# Patient Record
Sex: MALE | Race: WHITE | Employment: OTHER | URBAN - NONMETROPOLITAN AREA
[De-identification: names, ages, dates, MRNs, and addresses within clinical notes are randomized per-mention and may not be internally consistent; named-entity substitution may affect disease eponyms.]

---

## 2017-01-18 RX ORDER — CLONAZEPAM 1 MG/1
TABLET ORAL
Qty: 90 TABLET | Refills: 1 | Status: SHIPPED | OUTPATIENT
Start: 2017-01-18 | End: 2017-02-06 | Stop reason: SDUPTHER

## 2017-01-31 ENCOUNTER — OFFICE VISIT (OUTPATIENT)
Dept: INTERNAL MEDICINE | Age: 62
End: 2017-01-31

## 2017-01-31 VITALS
SYSTOLIC BLOOD PRESSURE: 134 MMHG | HEART RATE: 76 BPM | WEIGHT: 272 LBS | DIASTOLIC BLOOD PRESSURE: 84 MMHG | BODY MASS INDEX: 38.08 KG/M2 | HEIGHT: 71 IN

## 2017-01-31 DIAGNOSIS — J06.9 VIRAL URI: ICD-10-CM

## 2017-01-31 DIAGNOSIS — E78.5 DYSLIPIDEMIA: ICD-10-CM

## 2017-01-31 DIAGNOSIS — F40.01 AGORAPHOBIA WITH PANIC DISORDER: ICD-10-CM

## 2017-01-31 DIAGNOSIS — Z23 NEED FOR PROPHYLACTIC VACCINATION AND INOCULATION AGAINST INFLUENZA: ICD-10-CM

## 2017-01-31 DIAGNOSIS — R73.01 IFG (IMPAIRED FASTING GLUCOSE): Primary | ICD-10-CM

## 2017-01-31 DIAGNOSIS — R74.8 INCREASED LIVER ENZYMES: ICD-10-CM

## 2017-01-31 DIAGNOSIS — Z12.11 SPECIAL SCREENING FOR MALIGNANT NEOPLASMS, COLON: ICD-10-CM

## 2017-01-31 DIAGNOSIS — F33.42 RECURRENT MAJOR DEPRESSIVE DISORDER, IN FULL REMISSION (HCC): ICD-10-CM

## 2017-01-31 PROCEDURE — G8427 DOCREV CUR MEDS BY ELIG CLIN: HCPCS | Performed by: INTERNAL MEDICINE

## 2017-01-31 PROCEDURE — 1036F TOBACCO NON-USER: CPT | Performed by: INTERNAL MEDICINE

## 2017-01-31 PROCEDURE — 99214 OFFICE O/P EST MOD 30 MIN: CPT | Performed by: INTERNAL MEDICINE

## 2017-01-31 PROCEDURE — 90686 IIV4 VACC NO PRSV 0.5 ML IM: CPT | Performed by: INTERNAL MEDICINE

## 2017-01-31 PROCEDURE — G8484 FLU IMMUNIZE NO ADMIN: HCPCS | Performed by: INTERNAL MEDICINE

## 2017-01-31 PROCEDURE — G8419 CALC BMI OUT NRM PARAM NOF/U: HCPCS | Performed by: INTERNAL MEDICINE

## 2017-01-31 PROCEDURE — 3017F COLORECTAL CA SCREEN DOC REV: CPT | Performed by: INTERNAL MEDICINE

## 2017-01-31 PROCEDURE — G0008 ADMIN INFLUENZA VIRUS VAC: HCPCS | Performed by: INTERNAL MEDICINE

## 2017-01-31 RX ORDER — BUSPIRONE HYDROCHLORIDE 15 MG/1
15 TABLET ORAL 3 TIMES DAILY
Qty: 270 TABLET | Refills: 3 | Status: SHIPPED | OUTPATIENT
Start: 2017-01-31 | End: 2018-01-24 | Stop reason: SDUPTHER

## 2017-01-31 RX ORDER — CLONAZEPAM 1 MG/1
TABLET ORAL
Qty: 90 TABLET | Refills: 1 | Status: CANCELLED | OUTPATIENT
Start: 2017-01-31

## 2017-01-31 RX ORDER — SIMVASTATIN 20 MG
TABLET ORAL
Qty: 90 TABLET | Refills: 3 | Status: SHIPPED | OUTPATIENT
Start: 2017-01-31 | End: 2018-01-17 | Stop reason: SDUPTHER

## 2017-01-31 RX ORDER — TAMSULOSIN HYDROCHLORIDE 0.4 MG/1
CAPSULE ORAL
Qty: 90 CAPSULE | Refills: 3 | Status: SHIPPED | OUTPATIENT
Start: 2017-01-31 | End: 2018-01-24 | Stop reason: SDUPTHER

## 2017-01-31 RX ORDER — PAROXETINE 30 MG/1
TABLET, FILM COATED ORAL
Qty: 180 TABLET | Refills: 3 | Status: SHIPPED | OUTPATIENT
Start: 2017-01-31 | End: 2018-01-17 | Stop reason: SDUPTHER

## 2017-01-31 ASSESSMENT — ENCOUNTER SYMPTOMS
ABDOMINAL PAIN: 0
VOMITING: 0
NAUSEA: 0
COUGH: 1
EYE PAIN: 0
DIARRHEA: 0
BLURRED VISION: 0
SHORTNESS OF BREATH: 0
DOUBLE VISION: 0
WHEEZING: 0
EYE DISCHARGE: 0
SORE THROAT: 0
BLOOD IN STOOL: 0
CONSTIPATION: 0

## 2017-02-06 PROCEDURE — 82274 ASSAY TEST FOR BLOOD FECAL: CPT | Performed by: INTERNAL MEDICINE

## 2017-02-06 RX ORDER — CLONAZEPAM 1 MG/1
TABLET ORAL
Qty: 90 TABLET | Refills: 1 | OUTPATIENT
Start: 2017-02-06 | End: 2017-04-14 | Stop reason: SDUPTHER

## 2017-02-13 DIAGNOSIS — Z12.11 SPECIAL SCREENING FOR MALIGNANT NEOPLASMS, COLON: ICD-10-CM

## 2017-02-13 LAB
DATE, STOOL #1: NORMAL
DATE, STOOL #2: NORMAL
DATE, STOOL #3: NORMAL
HEMOCCULT SP1 STL QL: NEGATIVE
HEMOCCULT SP2 STL QL: NORMAL
HEMOCCULT SP3 STL QL: NORMAL
TIME, STOOL #1: NORMAL
TIME, STOOL #2: NORMAL
TIME, STOOL #3: NORMAL

## 2017-04-14 RX ORDER — CLONAZEPAM 1 MG/1
TABLET ORAL
Qty: 90 TABLET | Refills: 1 | Status: SHIPPED | OUTPATIENT
Start: 2017-04-14 | End: 2017-10-16 | Stop reason: SDUPTHER

## 2017-10-16 RX ORDER — CLONAZEPAM 1 MG/1
TABLET ORAL
Qty: 90 TABLET | Refills: 1 | Status: SHIPPED | OUTPATIENT
Start: 2017-10-16 | End: 2018-04-09 | Stop reason: SDUPTHER

## 2017-10-18 RX ORDER — CLONAZEPAM 1 MG/1
TABLET ORAL
Qty: 90 TABLET | Refills: 1 | OUTPATIENT
Start: 2017-10-18

## 2017-10-18 NOTE — TELEPHONE ENCOUNTER
10-19-17- office received another request for Clonazepam today. Spoke with [facility] pharmacist- they do not have it recorded that a script was called in on 10-16-17. RX given to pharmacist verbally as approved.

## 2018-01-24 ENCOUNTER — OFFICE VISIT (OUTPATIENT)
Dept: INTERNAL MEDICINE | Age: 63
End: 2018-01-24
Payer: MEDICARE

## 2018-01-24 ENCOUNTER — HOSPITAL ENCOUNTER (OUTPATIENT)
Dept: LAB | Age: 63
Setting detail: SPECIMEN
Discharge: HOME | End: 2018-01-24
Payer: MEDICARE

## 2018-01-24 VITALS
DIASTOLIC BLOOD PRESSURE: 70 MMHG | WEIGHT: 275 LBS | SYSTOLIC BLOOD PRESSURE: 138 MMHG | HEIGHT: 71 IN | HEART RATE: 80 BPM | BODY MASS INDEX: 38.5 KG/M2

## 2018-01-24 DIAGNOSIS — N40.0 BENIGN PROSTATIC HYPERPLASIA WITHOUT LOWER URINARY TRACT SYMPTOMS: ICD-10-CM

## 2018-01-24 DIAGNOSIS — Z12.11 SPECIAL SCREENING FOR MALIGNANT NEOPLASMS, COLON: ICD-10-CM

## 2018-01-24 DIAGNOSIS — R74.8 INCREASED LIVER ENZYMES: ICD-10-CM

## 2018-01-24 DIAGNOSIS — F40.01 AGORAPHOBIA WITH PANIC DISORDER: Primary | ICD-10-CM

## 2018-01-24 DIAGNOSIS — E78.5 DYSLIPIDEMIA: ICD-10-CM

## 2018-01-24 DIAGNOSIS — R73.01 IFG (IMPAIRED FASTING GLUCOSE): ICD-10-CM

## 2018-01-24 DIAGNOSIS — F33.42 RECURRENT MAJOR DEPRESSIVE DISORDER, IN FULL REMISSION (HCC): ICD-10-CM

## 2018-01-24 LAB
ABSOLUTE EOS #: 0.2 K/UL (ref 0–0.4)
ABSOLUTE IMMATURE GRANULOCYTE: ABNORMAL K/UL (ref 0–0.3)
ABSOLUTE LYMPH #: 1 K/UL (ref 1–4.8)
ABSOLUTE MONO #: 0.4 K/UL (ref 0.1–1.2)
ALT SERPL-CCNC: 29 U/L (ref 5–41)
ANION GAP SERPL CALCULATED.3IONS-SCNC: 14 MMOL/L (ref 9–17)
AST SERPL-CCNC: 27 U/L
BASOPHILS # BLD: 1 % (ref 0–2)
BASOPHILS ABSOLUTE: 0 K/UL (ref 0–0.2)
BUN BLDV-MCNC: 11 MG/DL (ref 8–23)
BUN/CREAT BLD: 14 (ref 9–20)
CALCIUM SERPL-MCNC: 9.4 MG/DL (ref 8.6–10.4)
CHLORIDE BLD-SCNC: 102 MMOL/L (ref 98–107)
CHOLESTEROL/HDL RATIO: 4.2
CHOLESTEROL: 197 MG/DL
CO2: 25 MMOL/L (ref 20–31)
CREAT SERPL-MCNC: 0.79 MG/DL (ref 0.7–1.2)
DIFFERENTIAL TYPE: ABNORMAL
EOSINOPHILS RELATIVE PERCENT: 2 % (ref 1–8)
ESTIMATED AVERAGE GLUCOSE: 117 MG/DL
GFR AFRICAN AMERICAN: >60 ML/MIN
GFR NON-AFRICAN AMERICAN: >60 ML/MIN
GFR SERPL CREATININE-BSD FRML MDRD: ABNORMAL ML/MIN/{1.73_M2}
GFR SERPL CREATININE-BSD FRML MDRD: ABNORMAL ML/MIN/{1.73_M2}
GLUCOSE BLD-MCNC: 116 MG/DL (ref 70–99)
HBA1C MFR BLD: 5.7 % (ref 4.8–5.9)
HCT VFR BLD CALC: 45.2 % (ref 41–53)
HDLC SERPL-MCNC: 47 MG/DL
HEMOGLOBIN: 15.3 G/DL (ref 13.5–17.5)
IMMATURE GRANULOCYTES: ABNORMAL %
LDL CHOLESTEROL: 113 MG/DL (ref 0–130)
LYMPHOCYTES # BLD: 14 % (ref 15–43)
MCH RBC QN AUTO: 32 PG (ref 26–34)
MCHC RBC AUTO-ENTMCNC: 33.9 G/DL (ref 31–37)
MCV RBC AUTO: 94.5 FL (ref 80–100)
MONOCYTES # BLD: 6 % (ref 6–14)
NRBC AUTOMATED: ABNORMAL PER 100 WBC
PDW BLD-RTO: 14.2 % (ref 11–14.5)
PLATELET # BLD: 202 K/UL (ref 140–450)
PLATELET ESTIMATE: ABNORMAL
PMV BLD AUTO: 8.5 FL (ref 6–12)
POTASSIUM SERPL-SCNC: 4.2 MMOL/L (ref 3.7–5.3)
RBC # BLD: 4.78 M/UL (ref 4.5–5.9)
RBC # BLD: ABNORMAL 10*6/UL
SEG NEUTROPHILS: 77 % (ref 44–74)
SEGMENTED NEUTROPHILS ABSOLUTE COUNT: 5.4 K/UL (ref 1.8–7.7)
SODIUM BLD-SCNC: 141 MMOL/L (ref 135–144)
TOTAL CK: 56 U/L (ref 39–308)
TRIGL SERPL-MCNC: 186 MG/DL
VLDLC SERPL CALC-MCNC: ABNORMAL MG/DL (ref 1–30)
WBC # BLD: 7 K/UL (ref 3.5–11)
WBC # BLD: ABNORMAL 10*3/UL

## 2018-01-24 PROCEDURE — 99214 OFFICE O/P EST MOD 30 MIN: CPT | Performed by: INTERNAL MEDICINE

## 2018-01-24 PROCEDURE — 36415 COLL VENOUS BLD VENIPUNCTURE: CPT

## 2018-01-24 PROCEDURE — 85025 COMPLETE CBC W/AUTO DIFF WBC: CPT

## 2018-01-24 PROCEDURE — G8427 DOCREV CUR MEDS BY ELIG CLIN: HCPCS | Performed by: INTERNAL MEDICINE

## 2018-01-24 PROCEDURE — G8484 FLU IMMUNIZE NO ADMIN: HCPCS | Performed by: INTERNAL MEDICINE

## 2018-01-24 PROCEDURE — G0008 ADMIN INFLUENZA VIRUS VAC: HCPCS | Performed by: INTERNAL MEDICINE

## 2018-01-24 PROCEDURE — 1036F TOBACCO NON-USER: CPT | Performed by: INTERNAL MEDICINE

## 2018-01-24 PROCEDURE — 83036 HEMOGLOBIN GLYCOSYLATED A1C: CPT

## 2018-01-24 PROCEDURE — 90686 IIV4 VACC NO PRSV 0.5 ML IM: CPT | Performed by: INTERNAL MEDICINE

## 2018-01-24 PROCEDURE — 84450 TRANSFERASE (AST) (SGOT): CPT

## 2018-01-24 PROCEDURE — G8417 CALC BMI ABV UP PARAM F/U: HCPCS | Performed by: INTERNAL MEDICINE

## 2018-01-24 PROCEDURE — 80048 BASIC METABOLIC PNL TOTAL CA: CPT

## 2018-01-24 PROCEDURE — 80061 LIPID PANEL: CPT

## 2018-01-24 PROCEDURE — 84460 ALANINE AMINO (ALT) (SGPT): CPT

## 2018-01-24 PROCEDURE — 3017F COLORECTAL CA SCREEN DOC REV: CPT | Performed by: INTERNAL MEDICINE

## 2018-01-24 PROCEDURE — 82550 ASSAY OF CK (CPK): CPT

## 2018-01-24 RX ORDER — TAMSULOSIN HYDROCHLORIDE 0.4 MG/1
CAPSULE ORAL
Qty: 90 CAPSULE | Refills: 3 | Status: SHIPPED | OUTPATIENT
Start: 2018-01-24 | End: 2019-01-04 | Stop reason: SDUPTHER

## 2018-01-24 RX ORDER — BUSPIRONE HYDROCHLORIDE 15 MG/1
15 TABLET ORAL 3 TIMES DAILY
Qty: 270 TABLET | Refills: 3 | Status: SHIPPED | OUTPATIENT
Start: 2018-01-24 | End: 2019-03-19 | Stop reason: SDUPTHER

## 2018-01-24 ASSESSMENT — PATIENT HEALTH QUESTIONNAIRE - PHQ9
SUM OF ALL RESPONSES TO PHQ QUESTIONS 1-9: 0
1. LITTLE INTEREST OR PLEASURE IN DOING THINGS: 0
2. FEELING DOWN, DEPRESSED OR HOPELESS: 0
SUM OF ALL RESPONSES TO PHQ9 QUESTIONS 1 & 2: 0

## 2018-01-24 NOTE — PROGRESS NOTES
Have you had an allergic reaction to the flu (influenza) shot? no  Are you allergic to eggs or any component of the flu vaccine? no  Do you have a history of Guillain-[place] Syndrome (GBS), which is paralysis after receiving the flu vaccine? no  Are you feeling well today? yes  Flu vaccine given as ordered. Patient tolerated it well. No questions re: VIS information.

## 2018-01-29 NOTE — PROGRESS NOTES
Yani Smoker  YOB: 1955    Date of Service:  1/24/2018      HPI:  Hubert Bruno was seen in the internal medicine office today for:  Chief Complaint  Patient presents with  · Agoraphobia. · Depression. · Blood sugar problem. · Cholesterol problem. · Liver problem. · BPH. · and continued evaluation and management of chronic medical problems. · He has had a good year. It has been just about a year since we have seen him. He really wants to try to limit his followups in this office, yet he has done very well. He is no longer seeing Psychiatry. With the agoraphobia he is functioning pretty well on the regimen that Dr. Stuart Ill set up. He really does not want to get back in and see him for followup at this point. He also feels his mood has been good with regard to the depression. · With the blood sugar we reviewed the labs. Blood sugar is 118. He is at increased risk for diabetes. We talked about the importance of diet. He would prefer not to add medication at this point. He feels he can do a little better with his diet. · We talked about his abnormal liver tests in the past and the need to watch his weight. We discussed followup liver tests. · We talked about his cholesterol and he is not having any trouble with the statin. We discussed going ahead and getting followup blood work. We talked about diet. · We talked about colon cancer screening with colonoscopy which he declines but he agrees to fecal occult blood testing. · We talked about his BPH and he feels like he has benefiting from the Flomax. Review of Systems:  Constitutional:  Negative for chills, fever, and weight loss. HENT:  Negative for congestion, ear pain, and sore throat. Eyes:  Negative for blurred vision, double vision, pain and discharge. Respiratory:  Negative for cough, shortness of breath, and wheezing.   Cardiovascular:  Negative for chest pain, palpitations, and another family member. History   Smoking Status    Former Smoker   Smokeless Tobacco    Never Used     Comment: quit 6 yrs ago     History   Alcohol Use No       Physical Examination:  Constitutional:  He appears well-developed and well-nourished. No distress. HENT:  Head: Normocephalic and atraumatic. Right Ear:  External ear normal.  Left Ear:  External ear normal.  Nose:  Nose normal.  Mouth/Throat:  Oropharynx is clear and moist.  Eyes: Conjunctivae and EOM are normal.  Pupils are equal, round, and reactive to light. Right eye exhibits no discharge. Left eye exhibits no discharge. No scleral icterus. Neck:  Normal range of motion. Neck supple. No JVD present. No tracheal deviation present. No thyromegaly present. Cardiovascular:  Normal rate, normal heart sounds, and intact distal pulses. Exam reveals no gallop. Pulmonary/Chest:  Effort normal and breath sounds normal.  No respiratory distress. He has no wheezes. He has no rales. Abdominal:  Soft. Normal aorta and bowel sounds are normal.  He exhibits no distension and no mass. There is no hepatosplenomegaly. There is no tenderness. There is no rebound and no guarding. Musculoskeletal:  Normal range of motion. He exhibits no edema or tenderness. Lymphadenopathy:    He has no cervical adenopathy. Neurological:  He is alert. He has normal strength. No cranial nerve deficit or sensory deficit. He exhibits normal muscle tone. Skin:  Skin is warm and dry. No rash noted. He is not diaphoretic. No pallor. Psychiatric:  He has a normal mood and affect. His behavior is normal.  Judgment normal.      Vitals:    01/24/18 1010   BP: 138/70   Site: Right Arm   Position: Sitting   Cuff Size: Large Adult   Pulse: 80   Weight: 275 lb (124.7 kg)   Height: 5' 11\" (1.803 m)     Body mass index is 38.35 kg/m².     Wt Readings from Last 3 Encounters:   01/24/18 275 lb (124.7 kg)   01/31/17 272 lb (123.4 kg)   01/28/16 272 lb (123.4 kg)     BP

## 2018-01-30 ENCOUNTER — HOSPITAL ENCOUNTER (OUTPATIENT)
Age: 63
Setting detail: SPECIMEN
Discharge: HOME | End: 2018-01-30
Payer: MEDICARE

## 2018-01-30 PROCEDURE — 82274 ASSAY TEST FOR BLOOD FECAL: CPT

## 2018-01-31 DIAGNOSIS — Z12.11 SPECIAL SCREENING FOR MALIGNANT NEOPLASMS, COLON: ICD-10-CM

## 2018-01-31 LAB
DATE, STOOL #1: ABNORMAL
DATE, STOOL #2: ABNORMAL
DATE, STOOL #3: ABNORMAL
HEMOCCULT SP1 STL QL: POSITIVE
HEMOCCULT SP2 STL QL: ABNORMAL
HEMOCCULT SP3 STL QL: ABNORMAL
TIME, STOOL #1: 2200
TIME, STOOL #2: ABNORMAL
TIME, STOOL #3: ABNORMAL

## 2018-02-09 ENCOUNTER — TELEPHONE (OUTPATIENT)
Dept: INTERNAL MEDICINE | Age: 63
End: 2018-02-09

## 2018-02-09 DIAGNOSIS — Z12.11 SCREENING FOR COLON CANCER: Primary | ICD-10-CM

## 2018-02-12 ENCOUNTER — HOSPITAL ENCOUNTER (OUTPATIENT)
Age: 63
Setting detail: SPECIMEN
Discharge: HOME | End: 2018-02-12
Payer: MEDICARE

## 2018-02-12 LAB
DATE, STOOL #1: NORMAL
DATE, STOOL #2: NORMAL
DATE, STOOL #3: NORMAL
HEMOCCULT SP1 STL QL: NEGATIVE
HEMOCCULT SP2 STL QL: NORMAL
HEMOCCULT SP3 STL QL: NORMAL
TIME, STOOL #1: 930
TIME, STOOL #2: NORMAL
TIME, STOOL #3: NORMAL

## 2018-02-12 PROCEDURE — 82274 ASSAY TEST FOR BLOOD FECAL: CPT

## 2018-04-09 DIAGNOSIS — F40.01 AGORAPHOBIA WITH PANIC DISORDER: Primary | ICD-10-CM

## 2018-04-09 RX ORDER — CLONAZEPAM 1 MG/1
TABLET ORAL
Qty: 90 TABLET | Refills: 1 | Status: SHIPPED | OUTPATIENT
Start: 2018-04-09 | End: 2018-10-15 | Stop reason: SDUPTHER

## 2018-10-15 DIAGNOSIS — F40.01 AGORAPHOBIA WITH PANIC DISORDER: ICD-10-CM

## 2018-10-15 RX ORDER — CLONAZEPAM 1 MG/1
TABLET ORAL
Qty: 90 TABLET | Refills: 1 | Status: SHIPPED | OUTPATIENT
Start: 2018-10-15 | End: 2019-04-16 | Stop reason: SDUPTHER

## 2019-01-04 DIAGNOSIS — N40.0 BENIGN PROSTATIC HYPERPLASIA WITHOUT LOWER URINARY TRACT SYMPTOMS: ICD-10-CM

## 2019-01-04 RX ORDER — SIMVASTATIN 20 MG
TABLET ORAL
Qty: 90 TABLET | Refills: 3 | Status: SHIPPED | OUTPATIENT
Start: 2019-01-04 | End: 2019-12-19 | Stop reason: SDUPTHER

## 2019-01-04 RX ORDER — TAMSULOSIN HYDROCHLORIDE 0.4 MG/1
CAPSULE ORAL
Qty: 90 CAPSULE | Refills: 3 | Status: SHIPPED | OUTPATIENT
Start: 2019-01-04 | End: 2019-12-19 | Stop reason: SDUPTHER

## 2019-01-04 RX ORDER — PAROXETINE 30 MG/1
TABLET, FILM COATED ORAL
Qty: 180 TABLET | Refills: 3 | Status: SHIPPED | OUTPATIENT
Start: 2019-01-04 | End: 2019-12-19 | Stop reason: SDUPTHER

## 2019-03-12 ENCOUNTER — OFFICE VISIT (OUTPATIENT)
Dept: ORTHOPEDIC SURGERY | Age: 64
End: 2019-03-12
Payer: MEDICARE

## 2019-03-12 VITALS
HEART RATE: 84 BPM | SYSTOLIC BLOOD PRESSURE: 136 MMHG | HEIGHT: 71 IN | BODY MASS INDEX: 39.2 KG/M2 | WEIGHT: 280 LBS | DIASTOLIC BLOOD PRESSURE: 80 MMHG

## 2019-03-12 DIAGNOSIS — S52.502A CLOSED FRACTURE OF DISTAL END OF LEFT RADIUS, UNSPECIFIED FRACTURE MORPHOLOGY, INITIAL ENCOUNTER: ICD-10-CM

## 2019-03-12 DIAGNOSIS — M25.532 LEFT WRIST PAIN: Primary | ICD-10-CM

## 2019-03-12 PROCEDURE — 25600 CLTX DST RDL FX/EPHYS SEP WO: CPT | Performed by: PHYSICIAN ASSISTANT

## 2019-03-12 PROCEDURE — L3908 WHO COCK-UP NONMOLDE PRE OTS: HCPCS | Performed by: PHYSICIAN ASSISTANT

## 2019-03-12 RX ORDER — IBUPROFEN 800 MG/1
800 TABLET ORAL EVERY 6 HOURS PRN
COMMUNITY
End: 2021-02-11

## 2019-03-19 ENCOUNTER — OFFICE VISIT (OUTPATIENT)
Dept: INTERNAL MEDICINE | Age: 64
End: 2019-03-19
Payer: MEDICARE

## 2019-03-19 ENCOUNTER — HOSPITAL ENCOUNTER (OUTPATIENT)
Age: 64
Setting detail: SPECIMEN
Discharge: HOME | End: 2019-03-19
Payer: MEDICARE

## 2019-03-19 VITALS
WEIGHT: 278 LBS | HEIGHT: 71 IN | HEART RATE: 72 BPM | SYSTOLIC BLOOD PRESSURE: 134 MMHG | DIASTOLIC BLOOD PRESSURE: 74 MMHG | BODY MASS INDEX: 38.92 KG/M2

## 2019-03-19 DIAGNOSIS — R73.01 IFG (IMPAIRED FASTING GLUCOSE): ICD-10-CM

## 2019-03-19 DIAGNOSIS — Z12.11 SPECIAL SCREENING FOR MALIGNANT NEOPLASMS, COLON: ICD-10-CM

## 2019-03-19 DIAGNOSIS — Z00.00 ROUTINE GENERAL MEDICAL EXAMINATION AT A HEALTH CARE FACILITY: Primary | ICD-10-CM

## 2019-03-19 DIAGNOSIS — Z12.11 SCREENING FOR COLON CANCER: ICD-10-CM

## 2019-03-19 DIAGNOSIS — F40.01 AGORAPHOBIA WITH PANIC DISORDER: ICD-10-CM

## 2019-03-19 DIAGNOSIS — R74.8 INCREASED LIVER ENZYMES: ICD-10-CM

## 2019-03-19 DIAGNOSIS — E78.5 DYSLIPIDEMIA: ICD-10-CM

## 2019-03-19 DIAGNOSIS — N40.0 BENIGN PROSTATIC HYPERPLASIA WITHOUT LOWER URINARY TRACT SYMPTOMS: ICD-10-CM

## 2019-03-19 DIAGNOSIS — F33.42 RECURRENT MAJOR DEPRESSIVE DISORDER, IN FULL REMISSION (HCC): ICD-10-CM

## 2019-03-19 DIAGNOSIS — Z13.6 SCREENING FOR CARDIOVASCULAR CONDITION: ICD-10-CM

## 2019-03-19 LAB
ALT SERPL-CCNC: 34 U/L (ref 5–41)
ANION GAP SERPL CALCULATED.3IONS-SCNC: 12 MMOL/L (ref 9–17)
AST SERPL-CCNC: 35 U/L
BUN BLDV-MCNC: 11 MG/DL (ref 8–23)
BUN/CREAT BLD: 14 (ref 9–20)
CALCIUM SERPL-MCNC: 9.3 MG/DL (ref 8.6–10.4)
CHLORIDE BLD-SCNC: 100 MMOL/L (ref 98–107)
CHOLESTEROL/HDL RATIO: 4.1
CHOLESTEROL: 187 MG/DL
CO2: 29 MMOL/L (ref 20–31)
CREAT SERPL-MCNC: 0.79 MG/DL (ref 0.7–1.2)
ESTIMATED AVERAGE GLUCOSE: 120 MG/DL
GFR AFRICAN AMERICAN: >60 ML/MIN
GFR NON-AFRICAN AMERICAN: >60 ML/MIN
GFR SERPL CREATININE-BSD FRML MDRD: ABNORMAL ML/MIN/{1.73_M2}
GFR SERPL CREATININE-BSD FRML MDRD: ABNORMAL ML/MIN/{1.73_M2}
GLUCOSE BLD-MCNC: 106 MG/DL (ref 70–99)
HBA1C MFR BLD: 5.8 % (ref 4.8–5.9)
HDLC SERPL-MCNC: 46 MG/DL
LDL CHOLESTEROL: 109 MG/DL (ref 0–130)
POTASSIUM SERPL-SCNC: 4.3 MMOL/L (ref 3.7–5.3)
SODIUM BLD-SCNC: 141 MMOL/L (ref 135–144)
TOTAL CK: 65 U/L (ref 39–308)
TRIGL SERPL-MCNC: 161 MG/DL
VLDLC SERPL CALC-MCNC: ABNORMAL MG/DL (ref 1–30)

## 2019-03-19 PROCEDURE — 80061 LIPID PANEL: CPT

## 2019-03-19 PROCEDURE — 84450 TRANSFERASE (AST) (SGOT): CPT

## 2019-03-19 PROCEDURE — 82550 ASSAY OF CK (CPK): CPT

## 2019-03-19 PROCEDURE — G0446 INTENS BEHAVE THER CARDIO DX: HCPCS | Performed by: INTERNAL MEDICINE

## 2019-03-19 PROCEDURE — G0438 PPPS, INITIAL VISIT: HCPCS | Performed by: INTERNAL MEDICINE

## 2019-03-19 PROCEDURE — 83036 HEMOGLOBIN GLYCOSYLATED A1C: CPT

## 2019-03-19 PROCEDURE — 36415 COLL VENOUS BLD VENIPUNCTURE: CPT

## 2019-03-19 PROCEDURE — 80048 BASIC METABOLIC PNL TOTAL CA: CPT

## 2019-03-19 PROCEDURE — 90686 IIV4 VACC NO PRSV 0.5 ML IM: CPT | Performed by: INTERNAL MEDICINE

## 2019-03-19 PROCEDURE — G0008 ADMIN INFLUENZA VIRUS VAC: HCPCS | Performed by: INTERNAL MEDICINE

## 2019-03-19 PROCEDURE — G8482 FLU IMMUNIZE ORDER/ADMIN: HCPCS | Performed by: INTERNAL MEDICINE

## 2019-03-19 PROCEDURE — G8417 CALC BMI ABV UP PARAM F/U: HCPCS | Performed by: INTERNAL MEDICINE

## 2019-03-19 PROCEDURE — 99213 OFFICE O/P EST LOW 20 MIN: CPT | Performed by: INTERNAL MEDICINE

## 2019-03-19 PROCEDURE — 1036F TOBACCO NON-USER: CPT | Performed by: INTERNAL MEDICINE

## 2019-03-19 PROCEDURE — 84460 ALANINE AMINO (ALT) (SGPT): CPT

## 2019-03-19 PROCEDURE — G8427 DOCREV CUR MEDS BY ELIG CLIN: HCPCS | Performed by: INTERNAL MEDICINE

## 2019-03-19 PROCEDURE — 3017F COLORECTAL CA SCREEN DOC REV: CPT | Performed by: INTERNAL MEDICINE

## 2019-03-19 RX ORDER — BUSPIRONE HYDROCHLORIDE 15 MG/1
15 TABLET ORAL 3 TIMES DAILY
Qty: 270 TABLET | Refills: 3 | Status: SHIPPED | OUTPATIENT
Start: 2019-03-19 | End: 2020-03-11 | Stop reason: SDUPTHER

## 2019-03-19 ASSESSMENT — PATIENT HEALTH QUESTIONNAIRE - PHQ9
SUM OF ALL RESPONSES TO PHQ QUESTIONS 1-9: 0
SUM OF ALL RESPONSES TO PHQ QUESTIONS 1-9: 0

## 2019-03-19 ASSESSMENT — ANXIETY QUESTIONNAIRES: GAD7 TOTAL SCORE: 3

## 2019-03-19 ASSESSMENT — LIFESTYLE VARIABLES: HOW OFTEN DO YOU HAVE A DRINK CONTAINING ALCOHOL: 0

## 2019-03-21 DIAGNOSIS — S62.102S CLOSED FRACTURE OF LEFT WRIST, SEQUELA: Primary | ICD-10-CM

## 2019-06-05 ENCOUNTER — HOSPITAL ENCOUNTER (OUTPATIENT)
Age: 64
Setting detail: SPECIMEN
Discharge: HOME | End: 2019-06-05
Payer: MEDICARE

## 2019-06-05 DIAGNOSIS — Z12.11 SPECIAL SCREENING FOR MALIGNANT NEOPLASMS, COLON: ICD-10-CM

## 2019-06-05 LAB
DATE, STOOL #1: NORMAL
DATE, STOOL #2: NORMAL
DATE, STOOL #3: NORMAL
HEMOCCULT SP1 STL QL: NEGATIVE
HEMOCCULT SP2 STL QL: NORMAL
HEMOCCULT SP3 STL QL: NORMAL
TIME, STOOL #1: 900
TIME, STOOL #2: NORMAL
TIME, STOOL #3: NORMAL

## 2019-06-05 PROCEDURE — 82274 ASSAY TEST FOR BLOOD FECAL: CPT

## 2019-10-17 DIAGNOSIS — F40.01 AGORAPHOBIA WITH PANIC DISORDER: ICD-10-CM

## 2019-10-18 RX ORDER — CLONAZEPAM 1 MG/1
TABLET ORAL
Qty: 90 TABLET | Refills: 0 | Status: SHIPPED | OUTPATIENT
Start: 2019-10-18 | End: 2020-01-13

## 2019-12-19 DIAGNOSIS — N40.0 BENIGN PROSTATIC HYPERPLASIA WITHOUT LOWER URINARY TRACT SYMPTOMS: ICD-10-CM

## 2019-12-19 RX ORDER — SIMVASTATIN 20 MG
20 TABLET ORAL NIGHTLY
Qty: 90 TABLET | Refills: 3 | Status: SHIPPED | OUTPATIENT
Start: 2019-12-19 | End: 2019-12-19 | Stop reason: SDUPTHER

## 2019-12-19 RX ORDER — PAROXETINE 30 MG/1
30 TABLET, FILM COATED ORAL EVERY MORNING
Qty: 90 TABLET | Refills: 3 | Status: SHIPPED | OUTPATIENT
Start: 2019-12-19 | End: 2019-12-19 | Stop reason: SDUPTHER

## 2019-12-19 RX ORDER — PAROXETINE 30 MG/1
30 TABLET, FILM COATED ORAL EVERY MORNING
Qty: 90 TABLET | Refills: 0 | Status: SHIPPED | OUTPATIENT
Start: 2019-12-19 | End: 2019-12-23 | Stop reason: SDUPTHER

## 2019-12-19 RX ORDER — PAROXETINE 30 MG/1
30 TABLET, FILM COATED ORAL EVERY MORNING
Qty: 180 TABLET | Refills: 3 | Status: SHIPPED | OUTPATIENT
Start: 2019-12-19 | End: 2019-12-19 | Stop reason: SDUPTHER

## 2019-12-19 RX ORDER — SIMVASTATIN 20 MG
20 TABLET ORAL NIGHTLY
Qty: 90 TABLET | Refills: 0 | Status: SHIPPED | OUTPATIENT
Start: 2019-12-19 | End: 2020-01-13 | Stop reason: SDUPTHER

## 2019-12-19 RX ORDER — TAMSULOSIN HYDROCHLORIDE 0.4 MG/1
CAPSULE ORAL
Qty: 90 CAPSULE | Refills: 3 | Status: SHIPPED | OUTPATIENT
Start: 2019-12-19 | End: 2021-01-12 | Stop reason: SDUPTHER

## 2019-12-23 RX ORDER — PAROXETINE 30 MG/1
60 TABLET, FILM COATED ORAL EVERY MORNING
Qty: 180 TABLET | Refills: 3 | Status: SHIPPED | OUTPATIENT
Start: 2019-12-23 | End: 2020-03-11 | Stop reason: SDUPTHER

## 2020-01-13 RX ORDER — CLONAZEPAM 1 MG/1
TABLET ORAL
Qty: 90 TABLET | Refills: 0 | Status: SHIPPED | OUTPATIENT
Start: 2020-01-13 | End: 2020-04-13 | Stop reason: SDUPTHER

## 2020-01-13 RX ORDER — CLONAZEPAM 1 MG/1
TABLET ORAL
Qty: 90 TABLET | Refills: 0 | Status: CANCELLED | OUTPATIENT
Start: 2020-01-13 | End: 2020-04-13

## 2020-01-13 RX ORDER — SIMVASTATIN 20 MG
20 TABLET ORAL NIGHTLY
Qty: 90 TABLET | Refills: 3 | Status: SHIPPED | OUTPATIENT
Start: 2020-01-13 | End: 2021-01-12 | Stop reason: SDUPTHER

## 2020-03-11 RX ORDER — BUSPIRONE HYDROCHLORIDE 15 MG/1
15 TABLET ORAL 3 TIMES DAILY
Qty: 270 TABLET | Refills: 3 | Status: SHIPPED | OUTPATIENT
Start: 2020-03-11 | End: 2020-12-07 | Stop reason: SDUPTHER

## 2020-03-11 RX ORDER — PAROXETINE 30 MG/1
60 TABLET, FILM COATED ORAL EVERY MORNING
Qty: 180 TABLET | Refills: 3 | Status: SHIPPED | OUTPATIENT
Start: 2020-03-11 | End: 2020-12-07 | Stop reason: SDUPTHER

## 2020-04-13 ENCOUNTER — VIRTUAL VISIT (OUTPATIENT)
Dept: INTERNAL MEDICINE | Age: 65
End: 2020-04-13
Payer: MEDICARE

## 2020-04-13 VITALS — BODY MASS INDEX: 39.05 KG/M2 | WEIGHT: 280 LBS

## 2020-04-13 PROCEDURE — G0439 PPPS, SUBSEQ VISIT: HCPCS | Performed by: INTERNAL MEDICINE

## 2020-04-13 PROCEDURE — 99214 OFFICE O/P EST MOD 30 MIN: CPT | Performed by: INTERNAL MEDICINE

## 2020-04-13 RX ORDER — CLONAZEPAM 1 MG/1
TABLET ORAL
Qty: 90 TABLET | Refills: 0 | Status: SHIPPED | OUTPATIENT
Start: 2020-04-13 | End: 2020-07-15

## 2020-04-13 ASSESSMENT — PATIENT HEALTH QUESTIONNAIRE - PHQ9
SUM OF ALL RESPONSES TO PHQ QUESTIONS 1-9: 0
SUM OF ALL RESPONSES TO PHQ QUESTIONS 1-9: 0

## 2020-04-13 ASSESSMENT — LIFESTYLE VARIABLES: HOW OFTEN DO YOU HAVE A DRINK CONTAINING ALCOHOL: 0

## 2020-04-13 NOTE — PROGRESS NOTES
visible discharge  HENT: External ears normal. No external lesions on the nose  Neck: No gross masses. No visible lesions. Respiratory: Respiratory effort normal. No visible signs of difficulty breathing  Skin: No abnormal rashes. No abnormal masses  Neurologic: Cranial nerves grossly intact  Psychiatric: Normal affect. Alert and oriented        Labs Reviewed 4/13/2020:    Lab Results   Component Value Date    WBC 7.0 01/24/2018    HGB 15.3 01/24/2018    HCT 45.2 01/24/2018     01/24/2018    CHOL 187 03/19/2019    TRIG 161 (H) 03/19/2019    HDL 46 03/19/2019    ALT 34 03/19/2019    AST 35 03/19/2019     03/19/2019    K 4.3 03/19/2019     03/19/2019    CREATININE 0.79 03/19/2019    BUN 11 03/19/2019    CO2 29 03/19/2019    TSH 2.07 10/15/2013    LABA1C 5.8 03/19/2019       Plan        Anxiety/panic disorder: Seems to be under control with current regimen, no evidence of abuse of Klonopin. Will continue. BPH: Continue Flomax. Asymptomatic at this time. Hyperlipidemia: Continue statin. Will obtain lipid panel next visit. Diagnosis Orders   1. Benign prostatic hyperplasia without lower urinary tract symptoms     2. IFG (impaired fasting glucose)  Hemoglobin A1C   3. Dyslipidemia  CBC Auto Differential    Comprehensive Metabolic Panel    Lipid Panel   4. Agoraphobia with panic disorder  clonazePAM (KLONOPIN) 1 MG tablet   5. Routine general medical examination at a health care facility           No follow-ups on file. Discussed use, benefit, and side effects of prescribed medications. All patient questions answered. Pt voiced understanding. Reviewed health maintenance. Electronically signed Heddy Boxer, MD on 4/13/2020 at 3:29 PM      This note has been created using the Epic electronic health record, and dictated in part by [employer] dictation system. Despite the documenting physician's best efforts, there may be errors in spelling, grammar or syntax.     Pursuant to the emergency declaration under the [facility] waiver authority and the [employer] Act, this Virtual Visit was conducted, with patient's consent, to reduce the patient's risk of exposure to COVID-19 and provide continuity of care for an established patient. Services were provided through a video synchronous discussion virtually to substitute for in-person clinic visit.

## 2020-07-15 RX ORDER — CLONAZEPAM 1 MG/1
TABLET ORAL
Qty: 90 TABLET | Refills: 0 | Status: SHIPPED | OUTPATIENT
Start: 2020-07-15 | End: 2020-10-13 | Stop reason: SDUPTHER

## 2020-09-09 ENCOUNTER — TELEPHONE (OUTPATIENT)
Dept: INTERNAL MEDICINE | Age: 65
End: 2020-09-09

## 2020-09-09 RX ORDER — LORAZEPAM 0.5 MG/1
0.5 TABLET ORAL EVERY 8 HOURS PRN
Qty: 2 TABLET | Refills: 0 | Status: SHIPPED | OUTPATIENT
Start: 2020-09-09 | End: 2020-09-10

## 2020-09-09 NOTE — TELEPHONE ENCOUNTER
Spoke with Helen and the procedure tomorrow if they can do the laser will be at least 40 minutes. Then he will have two more follow ups. She was wondering if you could give him something for the anxiety. Coming to the clinic he is fine, but for some reason he gets all worked up going to [employer] [employer]. Since his eye doctor goes there, there is no consoling him. Please advise.

## 2020-09-09 NOTE — TELEPHONE ENCOUNTER
Yes I can prescribe him Ativan, but I would prefer to know how long the surgery is going to be, and if he is going to get surgery tomorrow or not??

## 2020-09-09 NOTE — TELEPHONE ENCOUNTER
Spoke with Marycruz Benitez and she is going to check to see how long the surgery is going to take and if he needs to follw up with them.

## 2020-09-09 NOTE — TELEPHONE ENCOUNTER
Daughter-in-law, Gabriela Mary called. Cheri Hayes has an eye appt tomorrow and it may require surgery. Dr Priscilla Terrell said to contact our office to see if he could get something to take, to calm him, in case he does have to, as he has anxiety-panic disorder and he is already nervous about it. [facility]   Let Gabriela Hernandez know at [phone]

## 2020-09-14 ENCOUNTER — TELEPHONE (OUTPATIENT)
Dept: INTERNAL MEDICINE | Age: 65
End: 2020-09-14

## 2020-09-14 RX ORDER — LORAZEPAM 0.5 MG/1
0.5 TABLET ORAL 2 TIMES DAILY PRN
Qty: 2 TABLET | Refills: 1 | Status: SHIPPED | OUTPATIENT
Start: 2020-09-14 | End: 2020-10-01 | Stop reason: SDUPTHER

## 2020-09-14 NOTE — TELEPHONE ENCOUNTER
Patient has used the two tablets of Lorazepam he had for his anxiety for eye procedure. He has another appt next Monday and will need more medication for the anxiety.    Uses [facility]

## 2020-10-01 RX ORDER — LORAZEPAM 0.5 MG/1
0.5 TABLET ORAL 2 TIMES DAILY PRN
Qty: 2 TABLET | Refills: 0 | Status: SHIPPED | OUTPATIENT
Start: 2020-10-01 | End: 2020-11-16 | Stop reason: SDUPTHER

## 2020-10-13 ENCOUNTER — OFFICE VISIT (OUTPATIENT)
Dept: INTERNAL MEDICINE | Age: 65
End: 2020-10-13
Payer: MEDICARE

## 2020-10-13 ENCOUNTER — HOSPITAL ENCOUNTER (OUTPATIENT)
Dept: LAB | Age: 65
Discharge: HOME | End: 2020-10-13
Payer: MEDICARE

## 2020-10-13 VITALS
BODY MASS INDEX: 39.2 KG/M2 | HEIGHT: 71 IN | RESPIRATION RATE: 16 BRPM | HEART RATE: 76 BPM | WEIGHT: 280 LBS | SYSTOLIC BLOOD PRESSURE: 120 MMHG | DIASTOLIC BLOOD PRESSURE: 70 MMHG

## 2020-10-13 PROBLEM — E66.01 MORBIDLY OBESE (HCC): Status: ACTIVE | Noted: 2020-10-13

## 2020-10-13 LAB
ABSOLUTE EOS #: 0.14 K/UL (ref 0–0.44)
ABSOLUTE IMMATURE GRANULOCYTE: 0.03 K/UL (ref 0–0.3)
ABSOLUTE LYMPH #: 1.15 K/UL (ref 1.1–3.7)
ABSOLUTE MONO #: 0.38 K/UL (ref 0.1–1.2)
ALBUMIN SERPL-MCNC: 4.4 G/DL (ref 3.5–5.2)
ALBUMIN/GLOBULIN RATIO: 1.4 (ref 1–2.5)
ALP BLD-CCNC: 79 U/L (ref 40–129)
ALT SERPL-CCNC: 23 U/L (ref 5–41)
ANION GAP SERPL CALCULATED.3IONS-SCNC: 10 MMOL/L (ref 9–17)
AST SERPL-CCNC: 26 U/L
BASOPHILS # BLD: 1 % (ref 0–2)
BASOPHILS ABSOLUTE: 0.06 K/UL (ref 0–0.2)
BILIRUB SERPL-MCNC: 0.53 MG/DL (ref 0.3–1.2)
BUN BLDV-MCNC: 12 MG/DL (ref 8–23)
BUN/CREAT BLD: 15 (ref 9–20)
CALCIUM SERPL-MCNC: 9.7 MG/DL (ref 8.6–10.4)
CHLORIDE BLD-SCNC: 101 MMOL/L (ref 98–107)
CHOLESTEROL/HDL RATIO: 4.2
CHOLESTEROL: 187 MG/DL
CO2: 26 MMOL/L (ref 20–31)
CREAT SERPL-MCNC: 0.8 MG/DL (ref 0.7–1.2)
DIFFERENTIAL TYPE: ABNORMAL
EOSINOPHILS RELATIVE PERCENT: 2 % (ref 1–4)
ESTIMATED AVERAGE GLUCOSE: 117 MG/DL
GFR AFRICAN AMERICAN: >60 ML/MIN
GFR NON-AFRICAN AMERICAN: >60 ML/MIN
GFR SERPL CREATININE-BSD FRML MDRD: ABNORMAL ML/MIN/{1.73_M2}
GFR SERPL CREATININE-BSD FRML MDRD: ABNORMAL ML/MIN/{1.73_M2}
GLUCOSE BLD-MCNC: 137 MG/DL (ref 70–99)
HBA1C MFR BLD: 5.7 % (ref 4.8–5.9)
HCT VFR BLD CALC: 45.2 % (ref 40.7–50.3)
HDLC SERPL-MCNC: 45 MG/DL
HEMOGLOBIN: 14.5 G/DL (ref 13–17)
IMMATURE GRANULOCYTES: 0 %
LDL CHOLESTEROL: 103 MG/DL (ref 0–130)
LYMPHOCYTES # BLD: 17 % (ref 24–43)
MCH RBC QN AUTO: 31 PG (ref 25.2–33.5)
MCHC RBC AUTO-ENTMCNC: 32.1 G/DL (ref 25.2–33.5)
MCV RBC AUTO: 96.6 FL (ref 82.6–102.9)
MONOCYTES # BLD: 6 % (ref 3–12)
NRBC AUTOMATED: 0 PER 100 WBC
PDW BLD-RTO: 13.4 % (ref 11.8–14.4)
PLATELET # BLD: 205 K/UL (ref 138–453)
PLATELET ESTIMATE: ABNORMAL
PMV BLD AUTO: 10.2 FL (ref 8.1–13.5)
POTASSIUM SERPL-SCNC: 4.2 MMOL/L (ref 3.7–5.3)
RBC # BLD: 4.68 M/UL (ref 4.21–5.77)
RBC # BLD: ABNORMAL 10*6/UL
SEG NEUTROPHILS: 74 % (ref 36–65)
SEGMENTED NEUTROPHILS ABSOLUTE COUNT: 5.11 K/UL (ref 1.5–8.1)
SODIUM BLD-SCNC: 137 MMOL/L (ref 135–144)
TOTAL PROTEIN: 7.5 G/DL (ref 6.4–8.3)
TRIGL SERPL-MCNC: 197 MG/DL
VLDLC SERPL CALC-MCNC: ABNORMAL MG/DL (ref 1–30)
WBC # BLD: 6.9 K/UL (ref 3.5–11.3)
WBC # BLD: ABNORMAL 10*3/UL

## 2020-10-13 PROCEDURE — G0008 ADMIN INFLUENZA VIRUS VAC: HCPCS | Performed by: INTERNAL MEDICINE

## 2020-10-13 PROCEDURE — 99214 OFFICE O/P EST MOD 30 MIN: CPT | Performed by: INTERNAL MEDICINE

## 2020-10-13 PROCEDURE — 36415 COLL VENOUS BLD VENIPUNCTURE: CPT

## 2020-10-13 PROCEDURE — 83036 HEMOGLOBIN GLYCOSYLATED A1C: CPT

## 2020-10-13 PROCEDURE — 80061 LIPID PANEL: CPT

## 2020-10-13 PROCEDURE — 85025 COMPLETE CBC W/AUTO DIFF WBC: CPT

## 2020-10-13 PROCEDURE — 80053 COMPREHEN METABOLIC PANEL: CPT

## 2020-10-13 PROCEDURE — 90694 VACC AIIV4 NO PRSRV 0.5ML IM: CPT | Performed by: INTERNAL MEDICINE

## 2020-10-13 PROCEDURE — 99214 OFFICE O/P EST MOD 30 MIN: CPT

## 2020-10-13 RX ORDER — BRIMONIDINE TARTRATE/TIMOLOL 0.2%-0.5%
1 DROPS OPHTHALMIC (EYE) EVERY 12 HOURS
COMMUNITY
End: 2021-02-11 | Stop reason: ALTCHOICE

## 2020-10-13 RX ORDER — CLONAZEPAM 1 MG/1
TABLET ORAL
Qty: 90 TABLET | Refills: 0 | Status: SHIPPED | OUTPATIENT
Start: 2020-10-13 | End: 2021-01-12 | Stop reason: SDUPTHER

## 2020-10-13 RX ORDER — PILOCARPINE HYDROCHLORIDE 20 MG/ML
1 SOLUTION/ DROPS OPHTHALMIC 4 TIMES DAILY
COMMUNITY
End: 2021-02-11 | Stop reason: ALTCHOICE

## 2020-10-13 RX ORDER — LORAZEPAM 0.5 MG/1
0.5 TABLET ORAL PRN
COMMUNITY
End: 2020-12-07 | Stop reason: SDUPTHER

## 2020-10-13 RX ORDER — LATANOPROSTENE BUNOD 0.24 MG/ML
1 SOLUTION/ DROPS OPHTHALMIC NIGHTLY
COMMUNITY

## 2020-10-13 NOTE — PROGRESS NOTES
Have you had an allergic reaction to the flu (influenza) shot? NO  Are you allergic to eggs or any component of the flu vaccine? NO  Do you have a history of Guillain-[place] Syndrome (GBS), which is paralysis after receiving the flu vaccine? NO  Are you feeling well today? YES  Flu vaccine given as ordered. Patient tolerated it well. No questions re: VIS information.

## 2020-10-13 NOTE — PROGRESS NOTES
[address]  [place] [address]  Dept: [phone]  Dept Fax: 50-49-54-42: [phone]     Visit Date:  10/13/2020  Patient:  Max Eckert  YOB: 1955  Provider: Robles Cuello MD    This note is in the [address] (Assessment/Plan/Subjective/Objective) format, to reduce scrolling and searching for information by the reviewer. Assessment & Plan      Agoraphobia: Cannot be in big crowds. 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 Does not go grocery shopping or hang out with other people typically. Uses Klonopin as needed and dose has been stable for a while. Use BuSpar. We will continue to monitor. Hyperlipidemia: Continue statin. Will obtain lipid panel. BPH: Asymptomatic at this time. Continue Flomax    Acute angle-closure glaucoma: Last most of the vision in his left eye, vision recovered in the right eye, follows with ophthalmology, will defer management. Diagnosis Orders   1. Agoraphobia with panic disorder  clonazePAM (KLONOPIN) 1 MG tablet   2. 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 Flu vaccine need  INFLUENZA, QUADV, ADJUVANTED, 65 YRS =, IM, PF, PREFILL SYR, 0.5ML (FLUAD)   4. Dyslipidemia     5. Recurrent major depressive disorder, in full remission ([address]     6. Morbidly obese ([address]     7. Benign prostatic hyperplasia without lower urinary tract symptoms         Discussed use, benefit, and side effects of prescribed medications. All questions answered. Patient voiced understanding. Reviewed health maintenance. HPI:     He was seen in the internal medicine office today for   Chief Complaint   Patient presents with    Blood Sugar Problem    Hypertension    Hyperlipidemia    Anxiety    Depression        Presents to follow-up on chronic medical conditions.     Recently had acute angle glaucoma, which led to the loss of vision in both eyes several months ago, loss of vision was sudden, he was seen by ophthalmology, and had to undergo procedure to fix the glaucoma in the right eye, but was told that vision in the left eye has been lost.  Says that he sees only the edge out of his left eye. Says that this caused him to stumble, as he lost depth perception, but says that he is able to manage at this time. Has a history of agoraphobia with panic disorder, for which he is on Klonopin as needed as well as buspirone. Appears to be stable. Has a history of hyperlipidemia which has been stable. Has a history of BPH which has been stable on Flomax. Subjective:      REVIEW OF SYSTEMS    Constitutional: Denies fever, chills  Eyes: Denies recent vision changes  Cardiovascular: Denies chest pain, LL edema  Respiratory: Denies cough, wheezes  Gastrointestinal: Denies abdominal pain, nausea, vomiting  Skin: Denies rash  Endocrine: Denies polyuria  Hematologic: Denies bruising  Genitourinary: Denies dysuria, hematuria  Neurological: Denies headache, numbness      Medications    Current Outpatient Medications:     pilocarpine (PILOCAR) 2 % ophthalmic solution, Place 1 drop into both eyes 4 times daily, Disp: , Rfl:     Latanoprostene Bunod (VYZULTA) 0.024 % SOLN, Apply 1 drop to eye nightly, Disp: , Rfl:     brimonidine-timolol (COMBIGAN) 0.2-0.5 % ophthalmic solution, Place 1 drop into both eyes every 12 hours, Disp: , Rfl:     LORazepam (ATIVAN) 0.5 MG tablet, Take 0.5 mg by mouth as needed for Anxiety.  Need when he has eye appointment, Disp: , Rfl:     clonazePAM (KLONOPIN) 1 MG tablet, TAKE 1 TABLET BY MOUTH ONE TIME A DAY, Disp: 90 tablet, Rfl: 0    Handicap Placard MISC, by Does not apply route EXPIRES 10/13/2025, Disp: 3 each, Rfl: 0    PARoxetine (PAXIL) 30 MG tablet, Take 2 tablets by mouth every morning, Disp: 180 tablet, Rfl: 3    busPIRone (BUSPAR) 15 MG tablet, Take 15 mg by mouth 3 times daily, Disp: 270 tablet, Rfl: 3    simvastatin (ZOCOR) 20 MG tablet, Take 1 tablet by mouth nightly, Disp: 90 tablet, Rfl: 3    tamsulosin (FLOMAX) 0.4 MG capsule, TAKE 1 CAPSULE DAILY, Disp: 90 capsule, Rfl: 3    ibuprofen (ADVIL;MOTRIN) 800 MG tablet, Take 800 mg by mouth every 6 hours as needed for Pain, Disp: , Rfl:     aspirin 81 MG EC tablet, Take 81 mg by mouth daily. , Disp: , Rfl:     The patient has No Known Allergies. Past Medical History  [address] [place] [address]  has a past medical history of Agoraphobia with panic disorder, BPH (benign prostatic hyperplasia), Depression, History of tobacco abuse, Hyperlipemia, Hypogonadism, male, IFG (impaired fasting glucose), Increased liver enzymes, and Obesity. Past Surgical History  The patient  has no past surgical history on file. Family History  This patient's family history includes Cancer in his father, mother, and another family member. Social History  [address] [place] [address]  reports that he quit smoking about 12 years ago. He has a 30.00 pack-year smoking history. He has never used smokeless tobacco. He reports that he does not drink alcohol or use drugs.     Health Maintenance:    Health Maintenance   Topic Date Due    AAA screen  1955    Shingles Vaccine (1 of 2) 03/15/2005    Low dose CT lung screening  03/15/2010    Pneumococcal 65+ years Vaccine (1 of 1 - PPSV23) 03/15/2020    A1C test (Diabetic or Prediabetic)  03/19/2020    Lipid screen  03/19/2020    Colon Cancer Screen FIT/FOBT  06/05/2020    HIV screen  02/02/2023 (Originally 3/15/1970)    Annual Wellness Visit (AWV)  04/14/2021    DTaP/Tdap/Td vaccine (2 - Td) 01/27/2025    Flu vaccine  Completed    Hepatitis C screen  Completed    Hepatitis A vaccine  Aged Out    Hepatitis B vaccine  Aged Out    Hib vaccine  Aged Out    Meningococcal (ACWY) vaccine  Aged Out           Objective:     PHYSICAL EXAM  /70 (Site: Left Upper Arm, Position: Sitting, Cuff Size: Large Adult)   Pulse 76 Resp 16   Ht 5' 11\" (1.803 m)   Wt 280 lb (127 kg)   BMI 39.05 kg/m²   Constitutional: No acute distress. Sits in chair comfortably  Eyes: Sclerae nonicteric. No lid lag or proptosis  HENT: External ears normal. No external lesions on the nose  Neck: No gross masses. Trachea visibly midline  Respiratory: Good air entry bilaterally. No wheezing or crackles  Cardiovascular: Normal S1-S2. No murmurs. No lower extremity edema  Gastrointestinal: No visible masses. No visible hernias  Skin: No abnormal rashes. No abnormal masses  Neurologic: Cranial nerves grossly intact  Psychiatric: Normal affect. Alert and oriented        Labs Reviewed 10/13/2020:    Lab Results   Component Value Date    WBC 6.9 10/13/2020    HGB 14.5 10/13/2020    HCT 45.2 10/13/2020     10/13/2020    CHOL 187 03/19/2019    TRIG 161 (H) 03/19/2019    HDL 46 03/19/2019    ALT 23 10/13/2020    AST 26 10/13/2020     10/13/2020    K 4.2 10/13/2020     10/13/2020    CREATININE 0.80 10/13/2020    BUN 12 10/13/2020    CO2 26 10/13/2020    TSH 2.07 10/15/2013    LABA1C 5.7 10/13/2020            Electronically signed Vladimir GALEANO MD on 10/13/2020 at 2:34 PM      This note has been created using the Epic electronic health record, and dictated in part by [employer] dictation system. Despite the documenting physician's best efforts, there may be errors in spelling, grammar or syntax.

## 2020-11-16 ENCOUNTER — OFFICE VISIT (OUTPATIENT)
Dept: INTERNAL MEDICINE | Age: 65
End: 2020-11-16
Payer: MEDICARE

## 2020-11-16 VITALS
RESPIRATION RATE: 16 BRPM | DIASTOLIC BLOOD PRESSURE: 78 MMHG | SYSTOLIC BLOOD PRESSURE: 138 MMHG | HEIGHT: 71 IN | WEIGHT: 279 LBS | HEART RATE: 78 BPM | BODY MASS INDEX: 39.06 KG/M2 | TEMPERATURE: 96.6 F

## 2020-11-16 PROCEDURE — 99213 OFFICE O/P EST LOW 20 MIN: CPT

## 2020-11-16 PROCEDURE — 99213 OFFICE O/P EST LOW 20 MIN: CPT | Performed by: INTERNAL MEDICINE

## 2020-11-16 RX ORDER — LORAZEPAM 0.5 MG/1
0.5 TABLET ORAL DAILY
Qty: 5 TABLET | Refills: 0 | Status: SHIPPED | OUTPATIENT
Start: 2020-11-16 | End: 2020-11-21

## 2020-11-16 NOTE — PROGRESS NOTES
[address]  [place] [address]  Dept: [phone]  Dept Fax: 50-49-54-42: [phone]     Visit Date:  11/16/2020  Patient:  Merrill Antunez  YOB: 1955  Provider: Tammy Cheema MD    This note is in the [address] (Assessment/Plan/Subjective/Objective) format, to reduce scrolling and searching for information by the reviewer. Assessment & Plan      Cataract of right eye: Can proceed with surgery. No need for further testing. He is low-moderate risk. Can proceed with further surgeries as necessary    Agoraphobia: We will prescribe Ativan as needed for surgery. He already uses Klonopin, however will use the extra Ativan in order to have him get around the hospital in order to have him have the surgery done. Cataract surgery      Diagnosis Orders   1. Cataract of right eye, unspecified cataract type     2. Agoraphobia with panic disorder  LORazepam (ATIVAN) 0.5 MG tablet       Discussed use, benefit, and side effects of prescribed medications. All questions answered. Patient voiced understanding. Reviewed health maintenance. HPI:     He was seen in the internal medicine office today for   Chief Complaint   Patient presents with   Nadia Fraction     Right eye cataract Dr. Angeles Matthew for preoperative evaluation for right eye cataract surgery. Says that he has lost his vision in the left eye, which might be slowly getting better, due to angle-closure glaucoma. Was told by the eye doctor that he can have the right eye cataract repaired. Has a history of agoraphobia and has been using Klonopin for it. However, he needs something additional to be used as needed during the procedure and the office visits. He often does not leave the house, and says that leaving the house and going to the doctor causes significant anxiety. Does not go grocery shopping for the same reason.   I advised that I will prescribe Ativan as needed for him      Subjective:      REVIEW OF SYSTEMS    Constitutional: Denies fever, chills  Eyes: Denies recent vision changes  Cardiovascular: Denies chest pain, LL edema  Respiratory: Denies cough, wheezes  Gastrointestinal: Denies abdominal pain, nausea, vomiting  Skin: Denies rash  Endocrine: Denies polyuria  Hematologic: Denies bruising  Genitourinary: Denies dysuria, hematuria  Neurological: Denies headache, numbness      Medications    Current Outpatient Medications:     LORazepam (ATIVAN) 0.5 MG tablet, Take 1 tablet by mouth daily for 5 doses. , Disp: 5 tablet, Rfl: 0    pilocarpine (PILOCAR) 2 % ophthalmic solution, Place 1 drop into both eyes 4 times daily, Disp: , Rfl:     Latanoprostene Bunod (VYZULTA) 0.024 % SOLN, Apply 1 drop to eye nightly, Disp: , Rfl:     brimonidine-timolol (COMBIGAN) 0.2-0.5 % ophthalmic solution, Place 1 drop into both eyes every 12 hours, Disp: , Rfl:     LORazepam (ATIVAN) 0.5 MG tablet, Take 0.5 mg by mouth as needed for Anxiety. Need when he has eye appointment, Disp: , Rfl:     clonazePAM (KLONOPIN) 1 MG tablet, TAKE 1 TABLET BY MOUTH ONE TIME A DAY, Disp: 90 tablet, Rfl: 0    Handicap Placard MISC, by Does not apply route EXPIRES 10/13/2025, Disp: 3 each, Rfl: 0    PARoxetine (PAXIL) 30 MG tablet, Take 2 tablets by mouth every morning, Disp: 180 tablet, Rfl: 3    busPIRone (BUSPAR) 15 MG tablet, Take 15 mg by mouth 3 times daily, Disp: 270 tablet, Rfl: 3    simvastatin (ZOCOR) 20 MG tablet, Take 1 tablet by mouth nightly, Disp: 90 tablet, Rfl: 3    tamsulosin (FLOMAX) 0.4 MG capsule, TAKE 1 CAPSULE DAILY, Disp: 90 capsule, Rfl: 3    ibuprofen (ADVIL;MOTRIN) 800 MG tablet, Take 800 mg by mouth every 6 hours as needed for Pain, Disp: , Rfl:     aspirin 81 MG EC tablet, Take 81 mg by mouth daily. , Disp: , Rfl:     The patient has No Known Allergies.     Past Medical History  Reshma Romero  has a past medical history of Agoraphobia with panic disorder, BPH (benign prostatic hyperplasia), Depression, History of tobacco abuse, Hyperlipemia, Hypogonadism, male, IFG (impaired fasting glucose), Increased liver enzymes, and Obesity. Past Surgical History  The patient  has no past surgical history on file. Family History  This patient's family history includes Cancer in his father, mother, and another family member. Social History  Nonda Goodell  reports that he quit smoking about 12 years ago. He has a 30.00 pack-year smoking history. He has never used smokeless tobacco. He reports that he does not drink alcohol or use drugs. Health Maintenance:    Health Maintenance   Topic Date Due    AAA screen  1955    Shingles Vaccine (1 of 2) 03/15/2005    Low dose CT lung screening  03/15/2010    Pneumococcal 65+ years Vaccine (1 of 1 - PPSV23) 03/15/2020    Colon Cancer Screen FIT/FOBT  06/05/2020    HIV screen  02/02/2023 (Originally 3/15/1970)    Annual Wellness Visit (AWV)  04/14/2021    A1C test (Diabetic or Prediabetic)  10/13/2021    Lipid screen  10/13/2021    DTaP/Tdap/Td vaccine (2 - Td) 01/27/2025    Flu vaccine  Completed    Hepatitis C screen  Completed    Hepatitis A vaccine  Aged Out    Hepatitis B vaccine  Aged Out    Hib vaccine  Aged Out    Meningococcal (ACWY) vaccine  Aged Out           Objective:     PHYSICAL EXAM  /78 (Site: Left Upper Arm, Position: Sitting, Cuff Size: Large Adult)   Pulse 78   Temp 96.6 °F (35.9 °C) (Infrared)   Resp 16   Ht 5' 11\" (1.803 m)   Wt 279 lb (126.6 kg)   BMI 38.91 kg/m²   Constitutional: No acute distress. Sits in chair comfortably  Eyes: Sclerae nonicteric. No lid lag or proptosis  HENT: External ears normal. No external lesions on the nose  Neck: No gross masses. Trachea visibly midline  Respiratory: Good air entry bilaterally. No wheezing or crackles  Cardiovascular: Normal S1-S2. No murmurs.   No lower extremity edema  Gastrointestinal: No visible masses. No visible hernias  Skin: No abnormal rashes. No abnormal masses  Neurologic: Cranial nerves grossly intact  Psychiatric: Normal affect. Alert and oriented        Labs Reviewed 11/16/2020:    Lab Results   Component Value Date    WBC 6.9 10/13/2020    HGB 14.5 10/13/2020    HCT 45.2 10/13/2020     10/13/2020    CHOL 187 10/13/2020    TRIG 197 (H) 10/13/2020    HDL 45 10/13/2020    ALT 23 10/13/2020    AST 26 10/13/2020     10/13/2020    K 4.2 10/13/2020     10/13/2020    CREATININE 0.80 10/13/2020    BUN 12 10/13/2020    CO2 26 10/13/2020    TSH 2.07 10/15/2013    LABA1C 5.7 10/13/2020            Electronically signed Jermaine GALEANO MD on 11/16/2020 at 3:24 PM      This note has been created using the Epic electronic health record, and dictated in part by [employer] dictation system. Despite the documenting physician's best efforts, there may be errors in spelling, grammar or syntax.

## 2020-12-07 RX ORDER — PAROXETINE 30 MG/1
60 TABLET, FILM COATED ORAL EVERY MORNING
Qty: 180 TABLET | Refills: 3 | Status: SHIPPED | OUTPATIENT
Start: 2020-12-07 | End: 2021-12-06

## 2020-12-07 RX ORDER — BUSPIRONE HYDROCHLORIDE 15 MG/1
15 TABLET ORAL 3 TIMES DAILY
Qty: 270 TABLET | Refills: 3 | Status: SHIPPED | OUTPATIENT
Start: 2020-12-07 | End: 2022-06-09

## 2020-12-07 RX ORDER — LORAZEPAM 0.5 MG/1
0.5 TABLET ORAL PRN
Qty: 6 TABLET | Refills: 0 | Status: SHIPPED | OUTPATIENT
Start: 2020-12-07 | End: 2020-12-14

## 2020-12-22 ENCOUNTER — TELEPHONE (OUTPATIENT)
Dept: INTERNAL MEDICINE | Age: 65
End: 2020-12-22

## 2020-12-22 RX ORDER — LORAZEPAM 0.5 MG/1
0.5 TABLET ORAL DAILY PRN
Qty: 7 TABLET | Refills: 0 | Status: SHIPPED | OUTPATIENT
Start: 2020-12-22 | End: 2021-09-10 | Stop reason: SDUPTHER

## 2020-12-22 NOTE — TELEPHONE ENCOUNTER
Patient is having the second surgery on his eye on 1/5/2021. Needs medical clearance, can you addend your last Office Note for this. Also needs script for Lorazepam 0.5mg for the covid test, the surgery and 2 follow ups. Send to [facility].   Fax medical clearance to Dr. Cisco Hudson office

## 2020-12-22 NOTE — TELEPHONE ENCOUNTER
I addended the note and added \"Can proceed with further surgeries as necessary\". I'm not sure what specifically is needed. Lorazepam prescribed.

## 2021-01-12 DIAGNOSIS — N40.0 BENIGN PROSTATIC HYPERPLASIA WITHOUT LOWER URINARY TRACT SYMPTOMS: ICD-10-CM

## 2021-01-12 DIAGNOSIS — F40.01 AGORAPHOBIA WITH PANIC DISORDER: ICD-10-CM

## 2021-01-12 RX ORDER — SIMVASTATIN 20 MG
20 TABLET ORAL NIGHTLY
Qty: 90 TABLET | Refills: 3 | Status: SHIPPED | OUTPATIENT
Start: 2021-01-12 | End: 2021-04-07

## 2021-01-12 RX ORDER — TAMSULOSIN HYDROCHLORIDE 0.4 MG/1
CAPSULE ORAL
Qty: 90 CAPSULE | Refills: 3 | Status: SHIPPED | OUTPATIENT
Start: 2021-01-12 | End: 2022-01-17

## 2021-01-12 RX ORDER — CLONAZEPAM 1 MG/1
TABLET ORAL
Qty: 90 TABLET | Refills: 0 | Status: SHIPPED | OUTPATIENT
Start: 2021-01-12 | End: 2021-04-12

## 2021-01-12 NOTE — TELEPHONE ENCOUNTER
Patient called in requesting a refill on Zocor, Flomax and klonopin sent to [facility].      Medication pended if agreeable    Last Appt:  11/16/2020  Next Appt:   4/13/2021  Med verified in Epic

## 2021-02-02 ENCOUNTER — VIRTUAL VISIT (OUTPATIENT)
Dept: INTERNAL MEDICINE | Age: 66
End: 2021-02-02
Payer: MEDICARE

## 2021-02-02 DIAGNOSIS — U07.1 COVID-19 VIRUS INFECTION: Primary | ICD-10-CM

## 2021-02-02 DIAGNOSIS — H26.9 CATARACT OF RIGHT EYE, UNSPECIFIED CATARACT TYPE: ICD-10-CM

## 2021-02-02 PROCEDURE — 99211 OFF/OP EST MAY X REQ PHY/QHP: CPT

## 2021-02-02 PROCEDURE — 1111F DSCHRG MED/CURRENT MED MERGE: CPT | Performed by: INTERNAL MEDICINE

## 2021-02-02 PROCEDURE — 99213 OFFICE O/P EST LOW 20 MIN: CPT | Performed by: INTERNAL MEDICINE

## 2021-02-02 RX ORDER — ALBUTEROL SULFATE 90 UG/1
2 AEROSOL, METERED RESPIRATORY (INHALATION) EVERY 6 HOURS PRN
COMMUNITY

## 2021-02-02 NOTE — PROGRESS NOTES
Current Outpatient Medications:     albuterol sulfate  (90 Base) MCG/ACT inhaler, Inhale 2 puffs into the lungs every 6 hours as needed for Wheezing, Disp: , Rfl:     clonazePAM (KLONOPIN) 1 MG tablet, TAKE 1 TABLET BY MOUTH ONE TIME A DAY, Disp: 90 tablet, Rfl: 0    simvastatin (ZOCOR) 20 MG tablet, Take 1 tablet by mouth nightly, Disp: 90 tablet, Rfl: 3    tamsulosin (FLOMAX) 0.4 MG capsule, TAKE 1 CAPSULE DAILY, Disp: 90 capsule, Rfl: 3    busPIRone (BUSPAR) 15 MG tablet, Take 15 mg by mouth 3 times daily, Disp: 270 tablet, Rfl: 3    PARoxetine (PAXIL) 30 MG tablet, Take 2 tablets by mouth every morning, Disp: 180 tablet, Rfl: 3    pilocarpine (PILOCAR) 2 % ophthalmic solution, Place 1 drop into both eyes 4 times daily, Disp: , Rfl:     Latanoprostene Bunod (VYZULTA) 0.024 % SOLN, Apply 1 drop to eye nightly, Disp: , Rfl:     brimonidine-timolol (COMBIGAN) 0.2-0.5 % ophthalmic solution, Place 1 drop into both eyes every 12 hours, Disp: , Rfl:     Handicap Placard MISC, by Does not apply route EXPIRES 10/13/2025, Disp: 3 each, Rfl: 0    ibuprofen (ADVIL;MOTRIN) 800 MG tablet, Take 800 mg by mouth every 6 hours as needed for Pain, Disp: , Rfl:     aspirin 81 MG EC tablet, Take 81 mg by mouth daily. , Disp: , Rfl:     The patient has No Known Allergies. Past Medical History  August Degroot  has a past medical history of Agoraphobia with panic disorder, BPH (benign prostatic hyperplasia), Depression, History of tobacco abuse, Hyperlipemia, Hypogonadism, male, IFG (impaired fasting glucose), Increased liver enzymes, and Obesity. Past Surgical History  The patient  has no past surgical history on file. Family History  This patient's family history includes Cancer in his father, mother, and another family member.     Social History Caterina Correa  reports that he quit smoking about 13 years ago. He has a 30.00 pack-year smoking history. He has never used smokeless tobacco. He reports that he does not drink alcohol or use drugs. Health Maintenance:    Health Maintenance   Topic Date Due    AAA screen  1955    Shingles Vaccine (1 of 2) 03/15/2005    Pneumococcal 65+ years Vaccine (1 of 1 - PPSV23) 03/15/2020    Colon Cancer Screen FIT/FOBT  06/05/2020    HIV screen  02/02/2023 (Originally 3/15/1970)    Annual Wellness Visit (AWV)  04/14/2021    A1C test (Diabetic or Prediabetic)  10/13/2021    Lipid screen  10/13/2021    Low dose CT lung screening  01/24/2022    DTaP/Tdap/Td vaccine (2 - Td) 01/27/2025    Flu vaccine  Completed    Hepatitis C screen  Completed    Hepatitis A vaccine  Aged Out    Hepatitis B vaccine  Aged Out    Hib vaccine  Aged Out    Meningococcal (ACWY) vaccine  Aged Out           Objective:     PHYSICAL EXAM  Due to this being a TeleHealth encounter, evaluation of the following organ systems is limited: Vitals/Constitutional/EENT/Resp/CV/GI//MS/Neuro/Skin/Heme-Lymph-Imm. Constitutional: No acute distress. Sits in chair comfortably. Eyes: No proptosis. No visible discharge  HENT: External ears normal. No external lesions on the nose  Neck: No gross masses. No visible lesions. Respiratory: Respiratory effort normal. No visible signs of difficulty breathing  Skin: No abnormal rashes. No abnormal masses  Neurologic: Cranial nerves grossly intact  Psychiatric: Normal affect.  Alert and oriented        Labs Reviewed 2/2/2021:    Lab Results   Component Value Date    WBC 6.9 10/13/2020    HGB 14.5 10/13/2020    HCT 45.2 10/13/2020     10/13/2020    CHOL 187 10/13/2020    TRIG 197 (H) 10/13/2020    HDL 45 10/13/2020    ALT 23 10/13/2020    AST 26 10/13/2020     10/13/2020    K 4.2 10/13/2020     10/13/2020    CREATININE 0.80 10/13/2020    BUN 12 10/13/2020    CO2 26 10/13/2020 TSH 2.07 10/15/2013    LABA1C 5.7 10/13/2020              Electronically signed Kevin GALEANO MD on 2/2/2021 at 12:45 PM      This note has been created using the Epic electronic health record, and dictated in part by [employer] dictation system. Despite the documenting physician's best efforts, there may be errors in spelling, grammar or syntax. Pursuant to the emergency declaration under the [address] [place] [address], [facility] waiver authority and the [employer] Act, this Virtual Visit was conducted, with patient's consent, to reduce the patient's risk of exposure to COVID-19 and provide continuity of care for an established patient. Services were provided through a video synchronous discussion virtually to substitute for in-person clinic visit. During the visit, the provider was in his office at [facility] in [place], [place] while the patient was at home.

## 2021-02-10 ENCOUNTER — TELEPHONE (OUTPATIENT)
Dept: INTERNAL MEDICINE | Age: 66
End: 2021-02-10
Payer: MEDICARE

## 2021-02-10 DIAGNOSIS — F40.01 AGORAPHOBIA WITH PANIC DISORDER: ICD-10-CM

## 2021-02-10 DIAGNOSIS — U07.1 INFECTION DUE TO 2019-NCOV: Primary | ICD-10-CM

## 2021-02-10 DIAGNOSIS — E66.01 MORBIDLY OBESE (HCC): ICD-10-CM

## 2021-02-10 DIAGNOSIS — N40.0 BENIGN PROSTATIC HYPERPLASIA WITHOUT LOWER URINARY TRACT SYMPTOMS: ICD-10-CM

## 2021-02-10 PROCEDURE — G0180 MD CERTIFICATION HHA PATIENT: HCPCS | Performed by: INTERNAL MEDICINE

## 2021-02-10 NOTE — TELEPHONE ENCOUNTER
[facility] Certification form completed 01/30/21 - 01/30/21 - 03/30/21.

## 2021-02-11 RX ORDER — BROMFENAC SODIUM 0.7 MG/ML
1 SOLUTION/ DROPS OPHTHALMIC DAILY
COMMUNITY
End: 2021-10-14 | Stop reason: ALTCHOICE

## 2021-02-11 RX ORDER — GUAIFENESIN, PSEUDOEPHEDRINE HYDROCHLORIDE 600; 60 MG/1; MG/1
1 TABLET, EXTENDED RELEASE ORAL EVERY 12 HOURS
COMMUNITY

## 2021-02-11 RX ORDER — PREDNISOLONE ACETATE 10 MG/ML
1 SUSPENSION/ DROPS OPHTHALMIC 2 TIMES DAILY
COMMUNITY
End: 2021-10-14 | Stop reason: ALTCHOICE

## 2021-02-11 RX ORDER — ACETAMINOPHEN 160 MG
TABLET,DISINTEGRATING ORAL DAILY
COMMUNITY

## 2021-04-07 RX ORDER — SIMVASTATIN 20 MG
20 TABLET ORAL NIGHTLY
Qty: 90 TABLET | Refills: 3 | Status: SHIPPED | OUTPATIENT
Start: 2021-04-07 | End: 2022-04-13 | Stop reason: SDUPTHER

## 2021-04-12 DIAGNOSIS — F40.01 AGORAPHOBIA WITH PANIC DISORDER: ICD-10-CM

## 2021-04-12 RX ORDER — CLONAZEPAM 1 MG/1
TABLET ORAL
Qty: 90 TABLET | Refills: 0 | Status: SHIPPED | OUTPATIENT
Start: 2021-04-12 | End: 2021-07-14

## 2021-04-13 ENCOUNTER — TELEMEDICINE (OUTPATIENT)
Dept: INTERNAL MEDICINE | Age: 66
End: 2021-04-13
Payer: MEDICARE

## 2021-04-13 DIAGNOSIS — F33.42 RECURRENT MAJOR DEPRESSIVE DISORDER, IN FULL REMISSION (HCC): ICD-10-CM

## 2021-04-13 DIAGNOSIS — H54.40 BLINDNESS OF LEFT EYE WITH NORMAL VISION IN CONTRALATERAL EYE: ICD-10-CM

## 2021-04-13 DIAGNOSIS — F40.01 AGORAPHOBIA WITH PANIC DISORDER: ICD-10-CM

## 2021-04-13 DIAGNOSIS — E66.01 MORBIDLY OBESE (HCC): ICD-10-CM

## 2021-04-13 DIAGNOSIS — E78.5 DYSLIPIDEMIA: Primary | ICD-10-CM

## 2021-04-13 DIAGNOSIS — R73.01 IFG (IMPAIRED FASTING GLUCOSE): ICD-10-CM

## 2021-04-13 PROCEDURE — 99442 PR PHYS/QHP TELEPHONE EVALUATION 11-20 MIN: CPT | Performed by: INTERNAL MEDICINE

## 2021-04-13 PROCEDURE — 99211 OFF/OP EST MAY X REQ PHY/QHP: CPT | Performed by: INTERNAL MEDICINE

## 2021-04-13 NOTE — PROGRESS NOTES
unchanged      Subjective:      REVIEW OF SYSTEMS    Constitutional: Denies fever, chills  Eyes: Denies recent vision changes  Cardiovascular: Denies chest pain, LL edema  Respiratory: Denies cough, wheezes  Gastrointestinal: Denies abdominal pain, nausea, vomiting  Skin: Denies rash  Endocrine: Denies polyuria  Hematologic: Denies bruising  Genitourinary: Denies dysuria, hematuria  Neurological: Denies headache, numbness      Medications    Current Outpatient Medications:     clonazePAM (KLONOPIN) 1 MG tablet, TAKE ONE TABLET BY MOUTH DAILY, Disp: 90 tablet, Rfl: 0    simvastatin (ZOCOR) 20 MG tablet, take 1 tablet by mouth nightly, Disp: 90 tablet, Rfl: 3    Ascorbic Acid (RITA-C PO), Take 1,000 mg by mouth daily, Disp: , Rfl:     Cholecalciferol (VITAMIN D3) 50 MCG (2000 UT) CAPS, Take by mouth daily, Disp: , Rfl:     pseudoephedrine-guaiFENesin (MUCINEX D)  MG per extended release tablet, Take 1 tablet by mouth every 12 hours, Disp: , Rfl:     bromfenac (PROLENSA) 0.07 % SOLN, 1 drop daily Instill 1 gtt daily in left eye, Disp: , Rfl:     prednisoLONE acetate (PRED FORTE) 1 % ophthalmic suspension, 1 drop 2 times daily, Disp: , Rfl:     albuterol sulfate  (90 Base) MCG/ACT inhaler, Inhale 2 puffs into the lungs every 6 hours as needed for Wheezing, Disp: , Rfl:     tamsulosin (FLOMAX) 0.4 MG capsule, TAKE 1 CAPSULE DAILY, Disp: 90 capsule, Rfl: 3    busPIRone (BUSPAR) 15 MG tablet, Take 15 mg by mouth 3 times daily, Disp: 270 tablet, Rfl: 3    PARoxetine (PAXIL) 30 MG tablet, Take 2 tablets by mouth every morning, Disp: 180 tablet, Rfl: 3    Latanoprostene Bunod (VYZULTA) 0.024 % SOLN, Apply 1 drop to eye nightly, Disp: , Rfl:     Handicap Placard MISC, by Does not apply route EXPIRES 10/13/2025, Disp: 3 each, Rfl: 0    aspirin 81 MG EC tablet, Take 81 mg by mouth daily. , Disp: , Rfl:     The patient has No Known Allergies.     Past Medical History  Monica Christopher  has a past medical history of Agoraphobia with panic disorder, BPH (benign prostatic hyperplasia), Depression, History of tobacco abuse, Hyperlipemia, Hypogonadism, male, IFG (impaired fasting glucose), Increased liver enzymes, and Obesity. Past Surgical History  The patient  has no past surgical history on file. Family History  This patient's family history includes Cancer in his father, mother, and another family member. Social History  Fabio Bashir  reports that he quit smoking about 13 years ago. He has a 30.00 pack-year smoking history. He has never used smokeless tobacco. He reports that he does not drink alcohol or use drugs.     Health Maintenance:    Health Maintenance   Topic Date Due    AAA screen  Never done    COVID-19 Vaccine (1) Never done    Shingles Vaccine (1 of 2) Never done    Pneumococcal 65+ years Vaccine (1 of 1 - PPSV23) Never done    Colon Cancer Screen FIT/FOBT  06/05/2020    Annual Wellness Visit (AWV)  04/14/2021    A1C test (Diabetic or Prediabetic)  10/13/2021    Lipid screen  10/13/2021    Low dose CT lung screening  01/24/2022    DTaP/Tdap/Td vaccine (2 - Td) 01/27/2025    Flu vaccine  Completed    Hepatitis C screen  Completed    Hepatitis A vaccine  Aged Out    Hepatitis B vaccine  Aged Out    Hib vaccine  Aged Out    Meningococcal (ACWY) vaccine  Aged Out           Objective:       Labs Reviewed 4/13/2021:    Lab Results   Component Value Date    WBC 6.9 10/13/2020    HGB 14.5 10/13/2020    HCT 45.2 10/13/2020     10/13/2020    CHOL 187 10/13/2020    TRIG 197 (H) 10/13/2020    HDL 45 10/13/2020    ALT 23 10/13/2020    AST 26 10/13/2020     10/13/2020    K 4.2 10/13/2020     10/13/2020    CREATININE 0.80 10/13/2020    BUN 12 10/13/2020    CO2 26 10/13/2020    TSH 2.07 10/15/2013    LABA1C 5.7 10/13/2020              Electronically signed Marion GALEANO MD on 4/13/2021 at 10:14 AM      This note has been created using the Epic electronic health record, and dictated in part by [employer] One dictation system. Despite the documenting physician's best efforts, there may be errors in spelling, grammar or syntax. Pursuant to the emergency declaration under the [facility], [facility] waiver authority and the [employer] Act, this phone visit was conducted, with patient's consent, to reduce the patient's risk of exposure to COVID-19 and provide continuity of care for an established patient. Services were provided through a phone discussion virtually to substitute for in-person clinic visit. During the phone call, the provider was in his office at [facility] in [place], [place] while the patient was at home.

## 2021-04-26 ENCOUNTER — TELEPHONE (OUTPATIENT)
Dept: INTERNAL MEDICINE | Age: 66
End: 2021-04-26

## 2021-04-26 DIAGNOSIS — Z12.11 ENCOUNTER FOR SCREENING COLONOSCOPY: Primary | ICD-10-CM

## 2021-07-14 DIAGNOSIS — F40.01 AGORAPHOBIA WITH PANIC DISORDER: ICD-10-CM

## 2021-07-14 RX ORDER — CLONAZEPAM 1 MG/1
TABLET ORAL
Qty: 90 TABLET | Refills: 1 | Status: SHIPPED | OUTPATIENT
Start: 2021-07-14 | End: 2022-01-10

## 2021-07-20 ENCOUNTER — TELEPHONE (OUTPATIENT)
Dept: INTERNAL MEDICINE | Age: 66
End: 2021-07-20

## 2021-07-20 ENCOUNTER — VIRTUAL VISIT (OUTPATIENT)
Dept: INTERNAL MEDICINE | Age: 66
End: 2021-07-20
Payer: MEDICARE

## 2021-07-20 DIAGNOSIS — L25.9 CONTACT DERMATITIS, UNSPECIFIED CONTACT DERMATITIS TYPE, UNSPECIFIED TRIGGER: Primary | ICD-10-CM

## 2021-07-20 PROBLEM — Z98.41 STATUS POST LASER CATARACT SURGERY OF BOTH EYES: Status: ACTIVE | Noted: 2020-12-04

## 2021-07-20 PROBLEM — H40.213 ACUTE ANGLE-CLOSURE GLAUCOMA OF BOTH EYES: Status: ACTIVE | Noted: 2020-09-08

## 2021-07-20 PROBLEM — H47.20 OPTIC ATROPHY: Status: ACTIVE | Noted: 2021-03-11

## 2021-07-20 PROBLEM — Z98.42 STATUS POST LASER CATARACT SURGERY OF BOTH EYES: Status: ACTIVE | Noted: 2020-12-04

## 2021-07-20 PROCEDURE — 99211 OFF/OP EST MAY X REQ PHY/QHP: CPT

## 2021-07-20 PROCEDURE — 99213 OFFICE O/P EST LOW 20 MIN: CPT | Performed by: NURSE PRACTITIONER

## 2021-07-20 RX ORDER — PREDNISONE 20 MG/1
20 TABLET ORAL DAILY
Qty: 5 TABLET | Refills: 0 | Status: SHIPPED | OUTPATIENT
Start: 2021-07-20 | End: 2021-07-25

## 2021-07-20 NOTE — TELEPHONE ENCOUNTER
Patient called in stating that he has posion ivy and would like to know If something can be sent into [facility]. Please advise.      Last Appt:  4/13/2021  Next Appt:   10/14/2021  Med verified in [address] [place] [address] Rd

## 2021-07-20 NOTE — TELEPHONE ENCOUNTER
Spoke with patient and he does have glaucoma of both eyes. Reconciled his problem list to show this. He will make sure the ophthalmologist is aware of the steroids.

## 2021-07-20 NOTE — TELEPHONE ENCOUNTER
Clarify if patient has glaucoma - he is currently on ophthalmologic medications for this, but is not listed in his medical history. As I did prescribe him oral steroids, this could increase his intraocular pressure.  Need to be sure his ophthalmologist is aware of this and verify no contraindication

## 2021-07-20 NOTE — PROGRESS NOTES
[facility]  [address] [facility] [place], [address] Emily Hwang  [phone]      [facility] c/o of Poison Ivy (right eye lid, right wrist, and bebeto area)      HPI:     HPI     Patient presents for evaluation and management of poison ivy. He states he was initially exposed to some weeds earlier this week. Initially developed a rash on his right wrist.  He has since noted development of a pruritic rash on his groin and just below his right eye. Denies any wheezing. Does have slight rash in periorbital area, has not had any pain, vision changes, or eye swelling. Has appointment with ophthalmologist tomorrow. Upon review of medications, it was noted that the patient does have glaucoma. Discussed risks and benefits of treatment with oral steroids given potentially increase his intraocular pressure and worsen his glaucoma; however, given the proximity of rash to his R eye, would not advise to treat with topical corticosteroid. Patient was advised to check with ophthalmologist prior to initiating treatment with prednisone.         Current Outpatient Medications   Medication Sig Dispense Refill    predniSONE (DELTASONE) 20 MG tablet Take 1 tablet by mouth daily for 5 days 5 tablet 0    clonazePAM (KLONOPIN) 1 MG tablet TAKE ONE TABLET BY MOUTH DAILY 90 tablet 1    simvastatin (ZOCOR) 20 MG tablet take 1 tablet by mouth nightly 90 tablet 3    Ascorbic Acid (RITA-C PO) Take 1,000 mg by mouth daily      Cholecalciferol (VITAMIN D3) 50 MCG (2000 UT) CAPS Take by mouth daily      pseudoephedrine-guaiFENesin (MUCINEX D)  MG per extended release tablet Take 1 tablet by mouth every 12 hours      bromfenac (PROLENSA) 0.07 % SOLN 1 drop daily Instill 1 gtt daily in left eye      prednisoLONE acetate (PRED FORTE) 1 % ophthalmic suspension 1 drop 2 times daily      albuterol sulfate  (90 Base) MCG/ACT inhaler Inhale 2 puffs into the lungs every 6 hours as needed for Wheezing      tamsulosin (FLOMAX) 0.4 MG capsule TAKE 1 CAPSULE DAILY 90 capsule 3    busPIRone (BUSPAR) 15 MG tablet Take 15 mg by mouth 3 times daily 270 tablet 3    PARoxetine (PAXIL) 30 MG tablet Take 2 tablets by mouth every morning 180 tablet 3    Latanoprostene Bunod (VYZULTA) 0.024 % SOLN Apply 1 drop to eye nightly      Handicap Placard MISC by Does not apply route EXPIRES 10/13/2025 3 each 0    aspirin 81 MG EC tablet Take 81 mg by mouth daily. No current facility-administered medications for this visit. No Known Allergies    Health Maintenance   Topic Date Due    AAA screen  Never done    COVID-19 Vaccine (1) Never done    Shingles Vaccine (1 of 2) Never done    Pneumococcal 65+ years Vaccine (1 of 1 - PPSV23) Never done    Colon Cancer Screen FIT/FOBT  06/05/2020    Annual Wellness Visit (AWV)  04/14/2021    Flu vaccine (1) 09/01/2021    A1C test (Diabetic or Prediabetic)  10/13/2021    Lipid screen  10/13/2021    Low dose CT lung screening  01/24/2022    DTaP/Tdap/Td vaccine (2 - Td or Tdap) 01/27/2025    Hepatitis C screen  Completed    Hepatitis A vaccine  Aged Out    Hepatitis B vaccine  Aged Out    Hib vaccine  Aged Out    Meningococcal (ACWY) vaccine  Aged Out       Subjective:      Review of Systems   Constitutional: Negative for chills and fever. HENT: Negative for congestion and rhinorrhea. Respiratory: Negative for cough and wheezing. Gastrointestinal: Negative for diarrhea, nausea and vomiting. Skin: Positive for rash. Neurological: Negative for dizziness and headaches. Objective: There were no vitals filed for this visit. Physical Exam  Constitutional:       General: He is not in acute distress. Appearance: Normal appearance. HENT:      Head: Normocephalic and atraumatic. Right Ear: External ear normal.      Left Ear: External ear normal.      Nose: No rhinorrhea. Mouth/Throat:      Lips: No lesions.    Eyes:      Conjunctiva/sclera: Coronavirus Preparedness and Response Supplemental Appropriations Act. Patient identification was verified, and a caregiver was present when appropriate. The patient was located in a state where the provider was credentialed to provide care. Total time spent for this encounter: Not billed by time    --NHI Cruz CNP on 7/21/2021 at 10:58 AM    An electronic signature was used to authenticate this note.             Electronically signed by NHI Cruz CNP, NP-C on 7/21/2021 at 10:58 AM

## 2021-07-21 ASSESSMENT — ENCOUNTER SYMPTOMS
COUGH: 0
RHINORRHEA: 0
VOMITING: 0
WHEEZING: 0
DIARRHEA: 0
NAUSEA: 0

## 2021-09-10 DIAGNOSIS — F40.01 AGORAPHOBIA WITH PANIC DISORDER: ICD-10-CM

## 2021-09-10 RX ORDER — LORAZEPAM 0.5 MG/1
0.5 TABLET ORAL DAILY PRN
Qty: 7 TABLET | Refills: 0 | Status: SHIPPED | OUTPATIENT
Start: 2021-09-10 | End: 2021-09-17

## 2021-09-10 NOTE — TELEPHONE ENCOUNTER
Last Appt:  7/20/2021  Next Appt:   10/14/2021  Med verified in [facility] [address]

## 2021-09-10 NOTE — TELEPHONE ENCOUNTER
[place] checked today    Controlled Substance Monitoring:    Acute and Chronic Pain Monitoring:   RX Monitoring 9/10/2021   Attestation -   Periodic Controlled Substance Monitoring No signs of potential drug abuse or diversion identified.

## 2021-10-03 ASSESSMENT — LIFESTYLE VARIABLES
HOW OFTEN DO YOU HAVE A DRINK CONTAINING ALCOHOL: NEVER
HOW OFTEN DO YOU HAVE A DRINK CONTAINING ALCOHOL: 0
AUDIT TOTAL SCORE: INCOMPLETE
AUDIT-C TOTAL SCORE: INCOMPLETE

## 2021-10-03 ASSESSMENT — PATIENT HEALTH QUESTIONNAIRE - PHQ9
2. FEELING DOWN, DEPRESSED OR HOPELESS: 0
SUM OF ALL RESPONSES TO PHQ QUESTIONS 1-9: 0
SUM OF ALL RESPONSES TO PHQ9 QUESTIONS 1 & 2: 0
1. LITTLE INTEREST OR PLEASURE IN DOING THINGS: 0
SUM OF ALL RESPONSES TO PHQ QUESTIONS 1-9: 0
SUM OF ALL RESPONSES TO PHQ QUESTIONS 1-9: 0

## 2021-10-14 ENCOUNTER — OFFICE VISIT (OUTPATIENT)
Dept: INTERNAL MEDICINE | Age: 66
End: 2021-10-14
Payer: MEDICARE

## 2021-10-14 ENCOUNTER — HOSPITAL ENCOUNTER (OUTPATIENT)
Age: 66
Setting detail: SPECIMEN
Discharge: HOME | End: 2021-10-14
Payer: MEDICARE

## 2021-10-14 VITALS
DIASTOLIC BLOOD PRESSURE: 84 MMHG | WEIGHT: 271.2 LBS | HEIGHT: 71 IN | BODY MASS INDEX: 37.97 KG/M2 | RESPIRATION RATE: 18 BRPM | HEART RATE: 64 BPM | TEMPERATURE: 97.2 F | SYSTOLIC BLOOD PRESSURE: 136 MMHG

## 2021-10-14 DIAGNOSIS — R61 EXCESSIVE SWEATING: ICD-10-CM

## 2021-10-14 DIAGNOSIS — L74.9 SWEATING ABNORMALITY: ICD-10-CM

## 2021-10-14 DIAGNOSIS — E78.5 DYSLIPIDEMIA: ICD-10-CM

## 2021-10-14 DIAGNOSIS — F40.01 AGORAPHOBIA WITH PANIC DISORDER: ICD-10-CM

## 2021-10-14 DIAGNOSIS — R73.01 IFG (IMPAIRED FASTING GLUCOSE): ICD-10-CM

## 2021-10-14 DIAGNOSIS — Z00.00 ROUTINE GENERAL MEDICAL EXAMINATION AT A HEALTH CARE FACILITY: Primary | ICD-10-CM

## 2021-10-14 LAB
ABSOLUTE EOS #: 0.11 K/UL (ref 0–0.44)
ABSOLUTE IMMATURE GRANULOCYTE: 0.04 K/UL (ref 0–0.3)
ABSOLUTE LYMPH #: 1.36 K/UL (ref 1.1–3.7)
ABSOLUTE MONO #: 0.49 K/UL (ref 0.1–1.2)
ALBUMIN SERPL-MCNC: 4.8 G/DL (ref 3.5–5.2)
ALBUMIN/GLOBULIN RATIO: 1.8 (ref 1–2.5)
ALP BLD-CCNC: 91 U/L (ref 40–129)
ALT SERPL-CCNC: 29 U/L (ref 5–41)
ANION GAP SERPL CALCULATED.3IONS-SCNC: 10 MMOL/L (ref 9–17)
AST SERPL-CCNC: 29 U/L
BASOPHILS # BLD: 1 % (ref 0–2)
BASOPHILS ABSOLUTE: 0.05 K/UL (ref 0–0.2)
BILIRUB SERPL-MCNC: 0.56 MG/DL (ref 0.3–1.2)
BUN BLDV-MCNC: 13 MG/DL (ref 8–23)
BUN/CREAT BLD: 15 (ref 9–20)
CALCIUM SERPL-MCNC: 9.5 MG/DL (ref 8.6–10.4)
CHLORIDE BLD-SCNC: 101 MMOL/L (ref 98–107)
CHOLESTEROL/HDL RATIO: 3.9
CHOLESTEROL: 188 MG/DL
CO2: 26 MMOL/L (ref 20–31)
CREAT SERPL-MCNC: 0.86 MG/DL (ref 0.7–1.2)
DIFFERENTIAL TYPE: ABNORMAL
EOSINOPHILS RELATIVE PERCENT: 2 % (ref 1–4)
ESTIMATED AVERAGE GLUCOSE: 123 MG/DL
GFR AFRICAN AMERICAN: >60 ML/MIN
GFR NON-AFRICAN AMERICAN: >60 ML/MIN
GFR SERPL CREATININE-BSD FRML MDRD: ABNORMAL ML/MIN/{1.73_M2}
GFR SERPL CREATININE-BSD FRML MDRD: ABNORMAL ML/MIN/{1.73_M2}
GLUCOSE BLD-MCNC: 132 MG/DL (ref 70–99)
HBA1C MFR BLD: 5.9 % (ref 4–6)
HCT VFR BLD CALC: 47 % (ref 40.7–50.3)
HDLC SERPL-MCNC: 48 MG/DL
HEMOGLOBIN: 15.1 G/DL (ref 13–17)
IMMATURE GRANULOCYTES: 1 %
LDL CHOLESTEROL: 95 MG/DL (ref 0–130)
LYMPHOCYTES # BLD: 18 % (ref 24–43)
MCH RBC QN AUTO: 31.2 PG (ref 25.2–33.5)
MCHC RBC AUTO-ENTMCNC: 32.1 G/DL (ref 25.2–33.5)
MCV RBC AUTO: 97.1 FL (ref 82.6–102.9)
MONOCYTES # BLD: 7 % (ref 3–12)
NRBC AUTOMATED: 0 PER 100 WBC
PDW BLD-RTO: 13.5 % (ref 11.8–14.4)
PLATELET # BLD: 214 K/UL (ref 138–453)
PLATELET ESTIMATE: ABNORMAL
PMV BLD AUTO: 9.9 FL (ref 8.1–13.5)
POTASSIUM SERPL-SCNC: 4 MMOL/L (ref 3.7–5.3)
RBC # BLD: 4.84 M/UL (ref 4.21–5.77)
RBC # BLD: ABNORMAL 10*6/UL
SEG NEUTROPHILS: 71 % (ref 36–65)
SEGMENTED NEUTROPHILS ABSOLUTE COUNT: 5.33 K/UL (ref 1.5–8.1)
SODIUM BLD-SCNC: 137 MMOL/L (ref 135–144)
TOTAL PROTEIN: 7.5 G/DL (ref 6.4–8.3)
TRIGL SERPL-MCNC: 227 MG/DL
TSH SERPL DL<=0.05 MIU/L-ACNC: 3.01 MIU/L (ref 0.3–5)
VLDLC SERPL CALC-MCNC: ABNORMAL MG/DL (ref 1–30)
WBC # BLD: 7.4 K/UL (ref 3.5–11.3)
WBC # BLD: ABNORMAL 10*3/UL

## 2021-10-14 PROCEDURE — G0439 PPPS, SUBSEQ VISIT: HCPCS | Performed by: INTERNAL MEDICINE

## 2021-10-14 PROCEDURE — 80053 COMPREHEN METABOLIC PANEL: CPT

## 2021-10-14 PROCEDURE — 84443 ASSAY THYROID STIM HORMONE: CPT

## 2021-10-14 PROCEDURE — 80061 LIPID PANEL: CPT

## 2021-10-14 PROCEDURE — 99213 OFFICE O/P EST LOW 20 MIN: CPT

## 2021-10-14 PROCEDURE — 83036 HEMOGLOBIN GLYCOSYLATED A1C: CPT

## 2021-10-14 PROCEDURE — 85025 COMPLETE CBC W/AUTO DIFF WBC: CPT

## 2021-10-14 PROCEDURE — 99214 OFFICE O/P EST MOD 30 MIN: CPT | Performed by: INTERNAL MEDICINE

## 2021-10-14 NOTE — PATIENT INSTRUCTIONS
Personalized Preventive Plan for Giacomo Carrizales - 10/14/2021  Medicare offers a range of preventive health benefits. Some of the tests and screenings are paid in full while other may be subject to a deductible, co-insurance, and/or copay. Some of these benefits include a comprehensive review of your medical history including lifestyle, illnesses that may run in your family, and various assessments and screenings as appropriate. After reviewing your medical record and screening and assessments performed today your provider may have ordered immunizations, labs, imaging, and/or referrals for you. A list of these orders (if applicable) as well as your Preventive Care list are included within your After Visit Summary for your review. Other Preventive Recommendations:    · A preventive eye exam performed by an eye specialist is recommended every 1-2 years to screen for glaucoma; cataracts, macular degeneration, and other eye disorders. · A preventive dental visit is recommended every 6 months. · Try to get at least 150 minutes of exercise per week or 10,000 steps per day on a pedometer . · Order or download the FREE \"Exercise & Physical Activity: Your Everyday Guide\" from [employer]. Call 9-246.865.6271 or search [employer] online. · You need 5839-0729 mg of calcium and 4450-6246 IU of vitamin D per day. It is possible to meet your calcium requirement with diet alone, but a vitamin D supplement is usually necessary to meet this goal.  · When exposed to the sun, use a sunscreen that protects against both UVA and UVB radiation with an SPF of 30 or greater. Reapply every 2 to 3 hours or after sweating, drying off with a towel, or swimming. · Always wear a seat belt when traveling in a car. Always wear a helmet when riding a bicycle or motorcycle.

## 2021-10-14 NOTE — PROGRESS NOTES
Medicare Annual Wellness Visit  Name: Tona Rondon Date: 10/14/2021   MRN: [id number] Sex: Male   Age: 77 y.o. Ethnicity: Non- / Non    : 1955 Race: White (non-)      Edy Negrete is here for No chief complaint on file. Screenings for behavioral, psychosocial and functional/safety risks, and cognitive dysfunction are all negative except as indicated below. These results, as well as other patient data from the [address] form, are documented in Flowsheets linked to this Encounter. No Known Allergies    Prior to Visit Medications    Medication Sig Taking?  Authorizing Provider   clonazePAM (KLONOPIN) 1 MG tablet TAKE ONE TABLET BY MOUTH DAILY  Isela Salinas MD   simvastatin (ZOCOR) 20 MG tablet take 1 tablet by mouth nightly  Isela Salinas MD   Ascorbic Acid (RITA-C PO) Take 1,000 mg by mouth daily  Historical Provider, MD   Cholecalciferol (VITAMIN D3) 50 MCG (2000) CAPS Take by mouth daily  Historical Provider, MD   pseudoephedrine-guaiFENesin ([place] [address] D)  MG per extended release tablet Take 1 tablet by mouth every 12 hours  Historical Provider, MD   bromfenac (PROLENSA) 0.07 % SOLN 1 drop daily Instill 1 gtt daily in left eye  Historical Provider, MD   prednisoLONE acetate (PRED FORTE) 1 % ophthalmic suspension 1 drop 2 times daily  Historical Provider, MD   albuterol sulfate  (90 Base) MCG/ACT inhaler Inhale 2 puffs into the lungs every 6 hours as needed for Wheezing  Historical Provider, MD   tamsulosin (FLOMAX) 0.4 MG capsule TAKE 1 CAPSULE DAILY  Isela Salinas MD   busPIRone (BUSPAR) 15 MG tablet Take 15 mg by mouth 3 times daily  Isela Salinas MD   PARoxetine (PAXIL) 30 MG tablet Take 2 tablets by mouth every morning  Isela Salinas MD   Latanoprostene Bunod (VYZULTA) 0.024 % SOLN Apply 1 drop to eye nightly  Historical Provider, MD   Handicap Placard MISC by Does not apply route EXPIRES 10/13/2025  Isela Salinas MD   aspirin 81 MG EC tablet Take 81 mg by mouth daily.  Historical Provider, MD       Past Medical History:   Diagnosis Date    Agoraphobia with panic disorder     1.) Dr Gretchen Reyes evaluation, Spring 2004    BPH (benign prostatic hyperplasia)     Depression     History of    History of tobacco abuse     quit tobacco in 2007    Hyperlipemia     Hypogonadism, male     IFG (impaired fasting glucose)     Increased liver enzymes     1.) Hepatitis B and C testing negative 09/12  Hemachromatosis screen neg 9/12      Obesity        No past surgical history on file. Family History   Problem Relation Age of Onset   Reina Se Cancer Mother         bone cancer    Cancer Father         non Hodgkin's lymphoma    Cancer Other     Diabetes Neg Hx     Heart Disease Neg Hx        CareTeam (Including outside providers/suppliers regularly involved in providing care):   Patient Care Team:  Olinda Holcomb MD as PCP - General (Internal Medicine)  Olinda Holcomb MD as PCP - Alexa Abrams Provider    Wt Readings from Last 3 Encounters:   11/16/20 279 lb (126.6 kg)   10/13/20 280 lb (127 kg)   04/13/20 280 lb (127 kg)     There were no vitals filed for this visit. There is no height or weight on file to calculate BMI. Based upon direct observation of the patient, evaluation of cognition reveals recent and remote memory intact. Patient's complete Health Risk Assessment and screening values have been reviewed and are found in Flowsheets. The following problems were reviewed today and where indicated follow up appointments were made and/or referrals ordered. Positive Risk Factor Screenings with Interventions:          General Health and ACP:  General  In general, how would you say your health is?: Very Good  In the past 7 days, have you experienced any of the following?  New or Increased Pain, New or Increased Fatigue, Loneliness, Social Isolation, Stress or Anger?: None of These  Do you get the social and emotional support that you need?: Yes  Do you have a Living Will?: (!) No  Advance Directives     Power of  Living Will ACP-Advance Directive ACP-Power of     Not on File Not on File Not on File Not on File      General Health Risk Interventions:  · No Living Will: Patient declines ACP discussion/assistance    Health Habits/Nutrition:  Health Habits/Nutrition  Do you exercise for at least 20 minutes 2-3 times per week?: Yes  Have you lost any weight without trying in the past 3 months?: No  Do you eat only one meal per day?: No  Have you seen the dentist within the past year?: Yes     Health Habits/Nutrition Interventions:  · NA       Personalized Preventive Plan   Current Health Maintenance Status  Immunization History   Administered Date(s) Administered    COVID-19, Donald Frero PF, 30mcg/0.3mL 09/10/2021, 10/01/2021    Influenza Virus Vaccine 11/04/2009, 10/14/2010, 10/21/2011, 10/18/2013, 01/27/2015, 01/28/2016    Influenza, Quadv, IM, PF (6 mo and older Fluzone, Flulaval, Fluarix, and 3 yrs and older Afluria) 01/31/2017, 01/24/2018, 03/19/2019    Influenza, Quadv, adjuvanted, 65 yrs +, IM, PF (Fluad) 10/13/2020    Td, unspecified formulation 03/25/2004    Tdap (Boostrix, Adacel) 01/27/2015        Health Maintenance   Topic Date Due    AAA screen  Never done    Shingles Vaccine (1 of 2) Never done    Pneumococcal 65+ years Vaccine (1 of 1 - PPSV23) Never done    Colon Cancer Screen FIT/FOBT  06/05/2020    Annual Wellness Visit (AWV)  04/14/2021    Flu vaccine (1) 09/01/2021    A1C test (Diabetic or Prediabetic)  10/13/2021    Lipid screen  10/13/2021    Low dose CT lung screening  01/24/2022    DTaP/Tdap/Td vaccine (2 - Td or Tdap) 01/27/2025    COVID-19 Vaccine  Completed    Hepatitis C screen  Completed    Hepatitis A vaccine  Aged Out    Hepatitis B vaccine  Aged Out    Hib vaccine  Aged Out    Meningococcal (ACWY) vaccine  Aged Out     Recommendations for [employer] Due: see orders and patient instructions/AVS.  .   Recommended screening schedule for the next 5-10 years is provided to the patient in written form: see Patient Instructions/AVS.    I, Darryle Bigger, LPN, 29/97/3705, performed the documented evaluation under the direct supervision of the attending physician.

## 2021-10-14 NOTE — PROGRESS NOTES
[facility] INTERNAL MEDICINE    Visit Date:  10/14/2021  Patient:  Leonela Palmer  YOB: 1955    Assessment & Plan     Excessive sweating: He says that it started when he started his Paxil. I advised that we can check thyroid function. He has extreme agoraphobia and Paxil seems to help, therefore he is hesitant about tapering off or stopping. We will continue to monitor. Hyperlipidemia: Continue simvastatin. We will continue to monitor. Impaired fasting glucose: We will continue to monitor. Agoraphobia with panic disorder: Continue BuSpar, Paxil, Klonopin. We will continue to monitor. Diagnosis Orders   1. Routine general medical examination at a health care facility     2. Sweating abnormality  TSH with Reflex   3. Dyslipidemia  CBC Auto Differential    Comprehensive Metabolic Panel    Lipid Panel   4. IFG (impaired fasting glucose)  Hemoglobin A1C   5. Excessive sweating  TSH with Reflex       Chief Complaint  Other (agoraphobia), Blood Sugar Problem, and Other (dyslipidemia)    History of Present Illness   Presents to follow-up. He reports that he has been having excessive sweating throughout his body on and off, says that he thinks it started when he started his Paxil years ago. However, I advised that we can check thyroid function. He denies diarrhea, constipation, tremor, increased anxiety. Says that the Paxil seems to be helping with his agoraphobia, so he is hesitant to taper off or stop it completely for now. Has a history of hyperlipidemia, impaired fasting glucose that are unchanged    Objective  /84   Pulse 64   Temp 97.2 °F (36.2 °C) (Tympanic)   Resp 18   Ht 5' 11\" (1.803 m)   Wt 271 lb 3.2 oz (123 kg)   BMI 37.82 kg/m²   Constitutional: No acute distress. Sits in chair comfortably  Eyes: Sclerae nonicteric. No lid lag or proptosis  HENT: External ears normal. No external lesions on the nose  Neck: No gross masses.  Trachea visibly midline  Respiratory: Good air entry bilaterally. No wheezing or crackles  Cardiovascular: Normal S1-S2. No murmurs. No lower extremity edema  Gastrointestinal: No visible masses. No visible hernias  Skin: No abnormal rashes. No abnormal masses  Neurologic: Cranial nerves grossly intact  Psychiatric: Normal affect. Alert and oriented    Medications  Current Outpatient Medications:     clonazePAM (KLONOPIN) 1 MG tablet, TAKE ONE TABLET BY MOUTH DAILY, Disp: 90 tablet, Rfl: 1    simvastatin (ZOCOR) 20 MG tablet, take 1 tablet by mouth nightly, Disp: 90 tablet, Rfl: 3    Ascorbic Acid (RITA-C PO), Take 1,000 mg by mouth daily, Disp: , Rfl:     Cholecalciferol (VITAMIN D3) 50 MCG (2000 UT) CAPS, Take by mouth daily, Disp: , Rfl:     pseudoephedrine-guaiFENesin (MUCINEX D)  MG per extended release tablet, Take 1 tablet by mouth every 12 hours, Disp: , Rfl:     albuterol sulfate  (90 Base) MCG/ACT inhaler, Inhale 2 puffs into the lungs every 6 hours as needed for Wheezing, Disp: , Rfl:     tamsulosin (FLOMAX) 0.4 MG capsule, TAKE 1 CAPSULE DAILY, Disp: 90 capsule, Rfl: 3    busPIRone (BUSPAR) 15 MG tablet, Take 15 mg by mouth 3 times daily, Disp: 270 tablet, Rfl: 3    PARoxetine (PAXIL) 30 MG tablet, Take 2 tablets by mouth every morning, Disp: 180 tablet, Rfl: 3    Latanoprostene Bunod (VYZULTA) 0.024 % SOLN, Apply 1 drop to eye nightly, Disp: , Rfl:     Handicap Placard MISC, by Does not apply route EXPIRES 10/13/2025, Disp: 3 each, Rfl: 0    aspirin 81 MG EC tablet, Take 81 mg by mouth daily. , Disp: , Rfl:     Allergies  Patient has no known allergies.     Past Medical History:   Diagnosis Date    Agoraphobia with panic disorder     1.) Dr Unruly Trevino evaluation, Spring 2004    BPH (benign prostatic hyperplasia)     Depression     History of    History of tobacco abuse     quit tobacco in 2007    Hyperlipemia     Hypogonadism, male     IFG (impaired fasting glucose)     Increased liver enzymes     1.) Hepatitis B and C testing negative 09/12  Hemachromatosis screen neg 9/12      Obesity      No past surgical history on file. Family History  This patient's family history includes Cancer in his father, mother, and another family member. Social History  Hawa Rossi  reports that he quit smoking about 13 years ago. He has a 30.00 pack-year smoking history. He has never used smokeless tobacco. He reports that he does not drink alcohol and does not use drugs. Discussed use, benefit, and side effects of prescribed medications. All questions answered. Patient voiced understanding. Reviewed health maintenance. Electronically signed Arnie Pallas, MD on 10/14/2021 at 2:20 PM    This note has been created using the Epic electronic health record, and dictated in part by [email or web address] [facility] dictation system. Despite the documenting physician's best efforts, there may be errors in spelling, grammar or syntax.

## 2021-12-06 RX ORDER — PAROXETINE 30 MG/1
TABLET, FILM COATED ORAL
Qty: 180 TABLET | Refills: 3 | Status: SHIPPED | OUTPATIENT
Start: 2021-12-06

## 2022-01-09 DIAGNOSIS — F40.01 AGORAPHOBIA WITH PANIC DISORDER: ICD-10-CM

## 2022-01-10 RX ORDER — CLONAZEPAM 1 MG/1
TABLET ORAL
Qty: 90 TABLET | Refills: 3 | Status: SHIPPED | OUTPATIENT
Start: 2022-01-10 | End: 2022-07-14

## 2022-01-10 NOTE — TELEPHONE ENCOUNTER
Last Appt:  10/14/2021  Next Appt:   4/18/2022  Med verified in [facility] [address]

## 2022-01-15 DIAGNOSIS — N40.0 BENIGN PROSTATIC HYPERPLASIA WITHOUT LOWER URINARY TRACT SYMPTOMS: ICD-10-CM

## 2022-01-17 RX ORDER — TAMSULOSIN HYDROCHLORIDE 0.4 MG/1
CAPSULE ORAL
Qty: 90 CAPSULE | Refills: 3 | Status: SHIPPED | OUTPATIENT
Start: 2022-01-17

## 2022-01-17 NOTE — TELEPHONE ENCOUNTER
Last Appt:  10/14/2021  Next Appt:   4/18/2022  Med verified in [facility] [address]

## 2022-04-13 RX ORDER — SIMVASTATIN 20 MG
20 TABLET ORAL NIGHTLY
Qty: 90 TABLET | Refills: 3 | Status: SHIPPED | OUTPATIENT
Start: 2022-04-13

## 2022-04-13 NOTE — TELEPHONE ENCOUNTER
Refill request     Medication pended if agreeable    Last Appt:  10/14/2021  Next Appt:   4/18/2022  Med verified in Epic

## 2022-05-23 ENCOUNTER — OFFICE VISIT (OUTPATIENT)
Dept: INTERNAL MEDICINE | Age: 67
End: 2022-05-23
Payer: MEDICARE

## 2022-05-23 VITALS
WEIGHT: 280 LBS | DIASTOLIC BLOOD PRESSURE: 80 MMHG | RESPIRATION RATE: 16 BRPM | HEART RATE: 68 BPM | HEIGHT: 71 IN | SYSTOLIC BLOOD PRESSURE: 136 MMHG | BODY MASS INDEX: 39.2 KG/M2 | OXYGEN SATURATION: 96 %

## 2022-05-23 DIAGNOSIS — E78.5 DYSLIPIDEMIA: Primary | ICD-10-CM

## 2022-05-23 DIAGNOSIS — N40.0 BENIGN PROSTATIC HYPERPLASIA WITHOUT LOWER URINARY TRACT SYMPTOMS: ICD-10-CM

## 2022-05-23 DIAGNOSIS — E66.01 SEVERE OBESITY (BMI 35.0-39.9) WITH COMORBIDITY (HCC): ICD-10-CM

## 2022-05-23 DIAGNOSIS — F33.42 RECURRENT MAJOR DEPRESSIVE DISORDER, IN FULL REMISSION (HCC): ICD-10-CM

## 2022-05-23 DIAGNOSIS — F40.01 AGORAPHOBIA WITH PANIC DISORDER: ICD-10-CM

## 2022-05-23 PROCEDURE — 99214 OFFICE O/P EST MOD 30 MIN: CPT | Performed by: INTERNAL MEDICINE

## 2022-05-23 PROCEDURE — 99213 OFFICE O/P EST LOW 20 MIN: CPT

## 2022-05-23 PROCEDURE — 90677 PCV20 VACCINE IM: CPT | Performed by: INTERNAL MEDICINE

## 2022-05-23 PROCEDURE — G0009 ADMIN PNEUMOCOCCAL VACCINE: HCPCS | Performed by: INTERNAL MEDICINE

## 2022-05-23 NOTE — PROGRESS NOTES
[facility] INTERNAL MEDICINE    Visit Date:  2022  Patient:  Marj Kohler  YOB: 1955    Assessment & Plan     Hyperlipidemia: Continue simvastatin. We will order lipid panel next visit. BPH: Asymptomatic at this time. Continue tamsulosin. Depression: Mood stable at this time. Continue Paxil. Agoraphobia with panic disorder: Uses Klonopin as well as BuSpar. Appears to be stable. Will order pneumonia shot today. Diagnosis Orders   1. Dyslipidemia     2. Severe obesity (BMI 35.0-39. 9) with comorbidity ([address]     3. Recurrent major depressive disorder, in full remission ([facility] [address]     4. Agoraphobia with panic disorder     5. Benign prostatic hyperplasia without lower urinary tract symptoms         Chief Complaint  6 Month Follow-Up    History of Present Illness   Presents to follow-up. Says that he is doing okay at this time. No issues reported. Has a history of hyperlipidemia, for which he is on simvastatin and depression which appear stable. Has a history of agoraphobia with panic disorder, he uses Klonopin BuSpar, and appears to be stable at this time. Says that he was able to make it to his brother-in-law .  He indicated that he is not going to do a colonoscopy due to his agoraphobia no matter the result of a Cologuard, therefore, I advised that I will not order it at this time. He is not interested in sleep scan for lung cancer at this time. Objective  /80 (Site: Right Upper Arm, Position: Sitting, Cuff Size: Large Adult)   Pulse 68   Resp 16   Ht 5' 11\" (1.803 m)   Wt 280 lb (127 kg)   SpO2 96%   BMI 39.05 kg/m²   Constitutional: No acute distress. Sits in chair comfortably  Eyes: Sclerae nonicteric. No lid lag or proptosis  HENT: External ears normal. No external lesions on the nose  Neck: No gross masses. Trachea visibly midline  Respiratory: Good air entry bilaterally. No wheezing or crackles  Cardiovascular: Normal S1-S2.   No murmurs. No lower extremity edema  Gastrointestinal: No visible masses. No visible hernias  Skin: No abnormal rashes. No abnormal masses  Neurologic: Cranial nerves grossly intact  Psychiatric: Normal affect. Alert and oriented    Medications  Current Outpatient Medications:     simvastatin (ZOCOR) 20 MG tablet, Take 1 tablet by mouth nightly, Disp: 90 tablet, Rfl: 3    tamsulosin (FLOMAX) 0.4 MG capsule, TAKE ONE CAPSULE BY MOUTH DAILY, Disp: 90 capsule, Rfl: 3    PARoxetine (PAXIL) 30 MG tablet, TAKE TWO TABLETS BY MOUTH EVERY MORNING, Disp: 180 tablet, Rfl: 3    Ascorbic Acid (RITA-C PO), Take 1,000 mg by mouth daily, Disp: , Rfl:     Cholecalciferol (VITAMIN D3) 50 MCG (2000 UT) CAPS, Take by mouth daily, Disp: , Rfl:     pseudoephedrine-guaiFENesin (MUCINEX D)  MG per extended release tablet, Take 1 tablet by mouth every 12 hours, Disp: , Rfl:     albuterol sulfate  (90 Base) MCG/ACT inhaler, Inhale 2 puffs into the lungs every 6 hours as needed for Wheezing, Disp: , Rfl:     busPIRone (BUSPAR) 15 MG tablet, Take 15 mg by mouth 3 times daily, Disp: 270 tablet, Rfl: 3    Latanoprostene Bunod (VYZULTA) 0.024 % SOLN, Apply 1 drop to eye nightly, Disp: , Rfl:     Handicap Placard MISC, by Does not apply route EXPIRES 10/13/2025, Disp: 3 each, Rfl: 0    aspirin 81 MG EC tablet, Take 81 mg by mouth daily. , Disp: , Rfl:     clonazePAM (KLONOPIN) 1 MG tablet, TAKE ONE TABLET BY MOUTH DAILY, Disp: 90 tablet, Rfl: 3    Allergies  Patient has no known allergies.     Past Medical History:   Diagnosis Date    Agoraphobia with panic disorder     1.) Dr Fauzia Bradley evaluation, Spring 2004    BPH (benign prostatic hyperplasia)     Depression     History of    History of tobacco abuse     quit tobacco in 2007    Hyperlipemia     Hypogonadism, male     IFG (impaired fasting glucose)     Increased liver enzymes     1.) Hepatitis B and C testing negative 09/12  Hemachromatosis screen neg 9/12      Obesity      No past surgical history on file. Family History  This patient's family history includes Cancer in his father, mother, and another family member. Social History  Cesar Curtis  reports that he quit smoking about 14 years ago. He has a 30.00 pack-year smoking history. He has never used smokeless tobacco. He reports that he does not drink alcohol and does not use drugs. Discussed use, benefit, and side effects of prescribed medications. All questions answered. Patient voiced understanding. Reviewed health maintenance. Electronically signed Han Haney MD on 5/23/2022 at 7:20 PM    This note has been created using the Epic electronic health record, and dictated in part by [email or web address] [facility] dictation system. Despite the documenting physician's best efforts, there may be errors in spelling, grammar or syntax.

## 2022-06-08 DIAGNOSIS — F40.01 AGORAPHOBIA WITH PANIC DISORDER: ICD-10-CM

## 2022-06-09 RX ORDER — BUSPIRONE HYDROCHLORIDE 15 MG/1
TABLET ORAL
Qty: 270 TABLET | Refills: 3 | Status: SHIPPED | OUTPATIENT
Start: 2022-06-09

## 2022-07-14 DIAGNOSIS — F40.01 AGORAPHOBIA WITH PANIC DISORDER: ICD-10-CM

## 2022-07-14 RX ORDER — CLONAZEPAM 1 MG/1
TABLET ORAL
Qty: 90 TABLET | Refills: 3 | Status: SHIPPED | OUTPATIENT
Start: 2022-07-14 | End: 2023-07-14

## 2022-11-08 ENCOUNTER — HOSPITAL ENCOUNTER (OUTPATIENT)
Age: 67
Setting detail: SPECIMEN
Discharge: HOME | End: 2022-11-08
Payer: MEDICARE

## 2022-11-08 ENCOUNTER — OFFICE VISIT (OUTPATIENT)
Dept: INTERNAL MEDICINE | Age: 67
End: 2022-11-08
Payer: MEDICARE

## 2022-11-08 ENCOUNTER — HOSPITAL ENCOUNTER (OUTPATIENT)
Dept: NON INVASIVE DIAGNOSTICS | Age: 67
Discharge: HOME | End: 2022-11-08
Payer: MEDICARE

## 2022-11-08 VITALS
RESPIRATION RATE: 16 BRPM | SYSTOLIC BLOOD PRESSURE: 126 MMHG | DIASTOLIC BLOOD PRESSURE: 70 MMHG | HEART RATE: 75 BPM | WEIGHT: 272 LBS | HEIGHT: 71 IN | OXYGEN SATURATION: 95 % | BODY MASS INDEX: 38.08 KG/M2

## 2022-11-08 DIAGNOSIS — E78.5 DYSLIPIDEMIA: Primary | ICD-10-CM

## 2022-11-08 DIAGNOSIS — E78.5 DYSLIPIDEMIA: ICD-10-CM

## 2022-11-08 DIAGNOSIS — N40.0 BENIGN PROSTATIC HYPERPLASIA WITHOUT LOWER URINARY TRACT SYMPTOMS: ICD-10-CM

## 2022-11-08 DIAGNOSIS — R55 SYNCOPE, UNSPECIFIED SYNCOPE TYPE: ICD-10-CM

## 2022-11-08 DIAGNOSIS — F40.01 AGORAPHOBIA WITH PANIC DISORDER: ICD-10-CM

## 2022-11-08 PROBLEM — F41.9 ANXIETY: Status: ACTIVE | Noted: 2021-01-24

## 2022-11-08 PROBLEM — H35.342 LAMELLAR MACULAR HOLE OF LEFT EYE: Status: ACTIVE | Noted: 2022-02-18

## 2022-11-08 PROBLEM — H04.123 DRY EYES: Status: ACTIVE | Noted: 2022-02-18

## 2022-11-08 PROBLEM — H35.373 EPIRETINAL MEMBRANE (ERM), BILATERAL: Status: ACTIVE | Noted: 2022-02-18

## 2022-11-08 PROBLEM — J12.82 PNEUMONIA DUE TO COVID-19 VIRUS: Status: ACTIVE | Noted: 2021-01-24

## 2022-11-08 PROBLEM — U07.1 PNEUMONIA DUE TO COVID-19 VIRUS: Status: ACTIVE | Noted: 2021-01-24

## 2022-11-08 LAB
ABSOLUTE EOS #: 0.11 K/UL (ref 0–0.44)
ABSOLUTE IMMATURE GRANULOCYTE: <0.03 K/UL (ref 0–0.3)
ABSOLUTE LYMPH #: 1.02 K/UL (ref 1.1–3.7)
ABSOLUTE MONO #: 0.48 K/UL (ref 0.1–1.2)
ALBUMIN SERPL-MCNC: 4.5 G/DL (ref 3.5–5.2)
ALBUMIN/GLOBULIN RATIO: 1.5 (ref 1–2.5)
ALP BLD-CCNC: 84 U/L (ref 40–129)
ALT SERPL-CCNC: 28 U/L (ref 5–41)
ANION GAP SERPL CALCULATED.3IONS-SCNC: 10 MMOL/L (ref 9–17)
AST SERPL-CCNC: 31 U/L
BASOPHILS # BLD: 1 % (ref 0–2)
BASOPHILS ABSOLUTE: 0.04 K/UL (ref 0–0.2)
BILIRUB SERPL-MCNC: 0.5 MG/DL (ref 0.3–1.2)
BUN BLDV-MCNC: 11 MG/DL (ref 8–23)
BUN/CREAT BLD: 13 (ref 9–20)
CALCIUM SERPL-MCNC: 9.6 MG/DL (ref 8.6–10.4)
CHLORIDE BLD-SCNC: 102 MMOL/L (ref 98–107)
CHOLESTEROL/HDL RATIO: 4.1
CHOLESTEROL: 181 MG/DL
CO2: 26 MMOL/L (ref 20–31)
CREAT SERPL-MCNC: 0.82 MG/DL (ref 0.7–1.2)
EOSINOPHILS RELATIVE PERCENT: 2 % (ref 1–4)
GFR SERPL CREATININE-BSD FRML MDRD: >60 ML/MIN/1.73M2
GLUCOSE BLD-MCNC: 99 MG/DL (ref 70–99)
HCT VFR BLD CALC: 44.9 % (ref 40.7–50.3)
HDLC SERPL-MCNC: 44 MG/DL
HEMOGLOBIN: 14.9 G/DL (ref 13–17)
IMMATURE GRANULOCYTES: 0 %
LDL CHOLESTEROL: 93 MG/DL (ref 0–130)
LV EF: 50 %
LVEF MODALITY: NORMAL
LYMPHOCYTES # BLD: 15 % (ref 24–43)
MCH RBC QN AUTO: 31.5 PG (ref 25.2–33.5)
MCHC RBC AUTO-ENTMCNC: 33.2 G/DL (ref 25.2–33.5)
MCV RBC AUTO: 94.9 FL (ref 82.6–102.9)
MONOCYTES # BLD: 7 % (ref 3–12)
NRBC AUTOMATED: 0 PER 100 WBC
PDW BLD-RTO: 13.2 % (ref 11.8–14.4)
PLATELET # BLD: 236 K/UL (ref 138–453)
PMV BLD AUTO: 10.1 FL (ref 8.1–13.5)
POTASSIUM SERPL-SCNC: 4.2 MMOL/L (ref 3.7–5.3)
RBC # BLD: 4.73 M/UL (ref 4.21–5.77)
SEG NEUTROPHILS: 75 % (ref 36–65)
SEGMENTED NEUTROPHILS ABSOLUTE COUNT: 5.17 K/UL (ref 1.5–8.1)
SODIUM BLD-SCNC: 138 MMOL/L (ref 135–144)
TOTAL PROTEIN: 7.5 G/DL (ref 6.4–8.3)
TRIGL SERPL-MCNC: 219 MG/DL
WBC # BLD: 6.8 K/UL (ref 3.5–11.3)

## 2022-11-08 PROCEDURE — 99212 OFFICE O/P EST SF 10 MIN: CPT | Performed by: INTERNAL MEDICINE

## 2022-11-08 PROCEDURE — 85025 COMPLETE CBC W/AUTO DIFF WBC: CPT

## 2022-11-08 PROCEDURE — 93306 TTE W/DOPPLER COMPLETE: CPT

## 2022-11-08 PROCEDURE — 99214 OFFICE O/P EST MOD 30 MIN: CPT | Performed by: INTERNAL MEDICINE

## 2022-11-08 PROCEDURE — G0008 ADMIN INFLUENZA VIRUS VAC: HCPCS | Performed by: INTERNAL MEDICINE

## 2022-11-08 PROCEDURE — PBSHW INFLUENZA, FLUAD, (AGE 65 Y+), IM, PF, 0.5 ML: Performed by: INTERNAL MEDICINE

## 2022-11-08 PROCEDURE — 1123F ACP DISCUSS/DSCN MKR DOCD: CPT | Performed by: INTERNAL MEDICINE

## 2022-11-08 PROCEDURE — 80053 COMPREHEN METABOLIC PANEL: CPT

## 2022-11-08 PROCEDURE — 36415 COLL VENOUS BLD VENIPUNCTURE: CPT

## 2022-11-08 PROCEDURE — 80061 LIPID PANEL: CPT

## 2022-11-08 RX ORDER — ALBUTEROL SULFATE 90 UG/1
2 AEROSOL, METERED RESPIRATORY (INHALATION) EVERY 6 HOURS PRN
Qty: 18 G | Refills: 5 | Status: SHIPPED | OUTPATIENT
Start: 2022-11-08

## 2022-11-08 ASSESSMENT — PATIENT HEALTH QUESTIONNAIRE - PHQ9
7. TROUBLE CONCENTRATING ON THINGS, SUCH AS READING THE NEWSPAPER OR WATCHING TELEVISION: 0
SUM OF ALL RESPONSES TO PHQ QUESTIONS 1-9: 0
3. TROUBLE FALLING OR STAYING ASLEEP: 0
SUM OF ALL RESPONSES TO PHQ9 QUESTIONS 1 & 2: 0
SUM OF ALL RESPONSES TO PHQ QUESTIONS 1-9: 0
9. THOUGHTS THAT YOU WOULD BE BETTER OFF DEAD, OR OF HURTING YOURSELF: 0
6. FEELING BAD ABOUT YOURSELF - OR THAT YOU ARE A FAILURE OR HAVE LET YOURSELF OR YOUR FAMILY DOWN: 0
2. FEELING DOWN, DEPRESSED OR HOPELESS: 0
8. MOVING OR SPEAKING SO SLOWLY THAT OTHER PEOPLE COULD HAVE NOTICED. OR THE OPPOSITE, BEING SO FIGETY OR RESTLESS THAT YOU HAVE BEEN MOVING AROUND A LOT MORE THAN USUAL: 0
SUM OF ALL RESPONSES TO PHQ QUESTIONS 1-9: 0
SUM OF ALL RESPONSES TO PHQ QUESTIONS 1-9: 0
10. IF YOU CHECKED OFF ANY PROBLEMS, HOW DIFFICULT HAVE THESE PROBLEMS MADE IT FOR YOU TO DO YOUR WORK, TAKE CARE OF THINGS AT HOME, OR GET ALONG WITH OTHER PEOPLE: 0
4. FEELING TIRED OR HAVING LITTLE ENERGY: 0
5. POOR APPETITE OR OVEREATING: 0
1. LITTLE INTEREST OR PLEASURE IN DOING THINGS: 0

## 2022-11-27 NOTE — PROGRESS NOTES
[facility] INTERNAL MEDICINE    Visit Date:  11/8/2022  Patient:  Ingrid Madrid  YOB: 1955    Assessment & Plan     Presyncope: We will order echo to assess. Reassess afterwards    Hyperlipidemia: Continue simvastatin. We will order lipid panel next visit. BPH: Asymptomatic at this time. Continue tamsulosin. Depression: Mood stable at this time. Continue Paxil. Agoraphobia with panic disorder: Uses Klonopin as well as BuSpar. Appears to be stable. Will order pneumonia shot today. Diagnosis Orders   1. Dyslipidemia  CBC with Auto Differential    Comprehensive Metabolic Panel    Lipid Panel      2. Syncope, unspecified syncope type  Echocardiogram complete          Chief Complaint  Other (Paramedics was called to house , dizzy , fell, couldn't  catch breath, last Thursday, ) and Dizziness (Has been happening every 3 weeks. 6 mth)    History of Present Illness   Presents to follow-up. Says that he has been feeling dizzy for the last several months, however he had an episode last week where he almost fell due to lightheadedness, he says that he does not think he passed out, but is not sure. Paramedics were called, and it seems that he could not catch his breath however he improved quickly afterwards. At this time, he denies chest pain, shortness of breath, lightheadedness. Has a history of hyperlipidemia, BPH, depression that are unchanged    Objective  /70 (Site: Left Upper Arm, Position: Sitting, Cuff Size: Medium Adult)   Pulse 75   Resp 16   Ht 5' 11\" (1.803 m)   Wt 272 lb (123.4 kg)   SpO2 95%   BMI 37.94 kg/m²   Constitutional: No acute distress. Sits in chair comfortably  Eyes: Sclerae nonicteric. No lid lag or proptosis  HENT: External ears normal. No external lesions on the nose  Neck: No gross masses. Trachea visibly midline  Respiratory: Good air entry bilaterally. No wheezing or crackles  Cardiovascular: Normal S1-S2. No murmurs.   No lower extremity edema  Gastrointestinal: No visible masses. No visible hernias  Skin: No abnormal rashes. No abnormal masses  Neurologic: Cranial nerves grossly intact  Psychiatric: Normal affect. Alert and oriented    Medications  Current Outpatient Medications:     albuterol sulfate HFA (PROVENTIL;VENTOLIN;PROAIR) 108 (90 Base) MCG/ACT inhaler, Inhale 2 puffs into the lungs every 6 hours as needed for Wheezing, Disp: 18 g, Rfl: 5    clonazePAM (KLONOPIN) 1 MG tablet, TAKE ONE TABLET BY MOUTH DAILY, Disp: 90 tablet, Rfl: 3    busPIRone (BUSPAR) 15 MG tablet, TAKE ONE TABLET BY MOUTH THREE TIMES A DAY, Disp: 270 tablet, Rfl: 3    simvastatin (ZOCOR) 20 MG tablet, Take 1 tablet by mouth nightly, Disp: 90 tablet, Rfl: 3    PARoxetine (PAXIL) 30 MG tablet, TAKE TWO TABLETS BY MOUTH EVERY MORNING, Disp: 180 tablet, Rfl: 3    Ascorbic Acid (RITA-C PO), Take 1,000 mg by mouth daily, Disp: , Rfl:     Cholecalciferol (VITAMIN D3) 50 MCG (2000 UT) CAPS, Take by mouth daily, Disp: , Rfl:     Latanoprostene Bunod (VYZULTA) 0.024 % SOLN, Apply 1 drop to eye nightly, Disp: , Rfl:     Handicap Placard MISC, by Does not apply route EXPIRES 10/13/2025, Disp: 3 each, Rfl: 0    aspirin 81 MG EC tablet, Take 81 mg by mouth daily. , Disp: , Rfl:     pseudoephedrine-guaiFENesin (MUCINEX D)  MG per extended release tablet, Take 1 tablet by mouth every 12 hours (Patient not taking: Reported on 11/8/2022), Disp: , Rfl:     Allergies  Patient has no known allergies. Past Medical History:   Diagnosis Date    Agoraphobia with panic disorder     1.) Dr Deshaun Joya evaluation, Spring 2004    BPH (benign prostatic hyperplasia)     Depression     History of    History of tobacco abuse     quit tobacco in 2007    Hyperlipemia     Hypogonadism, male     IFG (impaired fasting glucose)     Increased liver enzymes     1.) Hepatitis B and C testing negative 09/12  Hemachromatosis screen neg 9/12      Obesity      History reviewed.  No pertinent surgical history. Family History  This patient's family history includes Cancer in his father, mother, and another family member. Social History  Maia Nash  reports that he quit smoking about 14 years ago. His smoking use included cigarettes. He has a 30.00 pack-year smoking history. He has never used smokeless tobacco. He reports that he does not drink alcohol and does not use drugs. Discussed use, benefit, and side effects of prescribed medications. All questions answered. Patient voiced understanding. Reviewed health maintenance. Electronically signed Ernesto Chong MD on 11/8/2022 at 2:33 PM    This note has been created using the Epic electronic health record, and dictated in part by [employer] dictation system. Despite the documenting physician's best efforts, there may be errors in spelling, grammar or syntax.

## 2022-11-29 DIAGNOSIS — R55 SYNCOPE, UNSPECIFIED SYNCOPE TYPE: Primary | ICD-10-CM

## 2022-12-05 PROBLEM — H26.493 PCO (POSTERIOR CAPSULAR OPACIFICATION), BILATERAL: Status: ACTIVE | Noted: 2022-08-26

## 2022-12-06 RX ORDER — PAROXETINE 30 MG/1
TABLET, FILM COATED ORAL
Qty: 180 TABLET | Refills: 3 | Status: SHIPPED | OUTPATIENT
Start: 2022-12-06

## 2022-12-08 ENCOUNTER — TELEMEDICINE (OUTPATIENT)
Dept: INTERNAL MEDICINE | Age: 67
End: 2022-12-08
Payer: MEDICARE

## 2022-12-08 DIAGNOSIS — Z00.00 MEDICARE ANNUAL WELLNESS VISIT, SUBSEQUENT: Primary | ICD-10-CM

## 2022-12-08 DIAGNOSIS — E78.5 DYSLIPIDEMIA: ICD-10-CM

## 2022-12-08 DIAGNOSIS — F40.01 AGORAPHOBIA WITH PANIC DISORDER: ICD-10-CM

## 2022-12-08 PROCEDURE — 1123F ACP DISCUSS/DSCN MKR DOCD: CPT | Performed by: INTERNAL MEDICINE

## 2022-12-08 PROCEDURE — 99211 OFF/OP EST MAY X REQ PHY/QHP: CPT | Performed by: INTERNAL MEDICINE

## 2022-12-08 PROCEDURE — G0439 PPPS, SUBSEQ VISIT: HCPCS | Performed by: INTERNAL MEDICINE

## 2022-12-08 SDOH — ECONOMIC STABILITY: FOOD INSECURITY: WITHIN THE PAST 12 MONTHS, YOU WORRIED THAT YOUR FOOD WOULD RUN OUT BEFORE YOU GOT MONEY TO BUY MORE.: NEVER TRUE

## 2022-12-08 SDOH — ECONOMIC STABILITY: FOOD INSECURITY: WITHIN THE PAST 12 MONTHS, THE FOOD YOU BOUGHT JUST DIDN'T LAST AND YOU DIDN'T HAVE MONEY TO GET MORE.: NEVER TRUE

## 2022-12-08 ASSESSMENT — SOCIAL DETERMINANTS OF HEALTH (SDOH): HOW HARD IS IT FOR YOU TO PAY FOR THE VERY BASICS LIKE FOOD, HOUSING, MEDICAL CARE, AND HEATING?: NOT VERY HARD

## 2022-12-08 ASSESSMENT — PATIENT HEALTH QUESTIONNAIRE - PHQ9
5. POOR APPETITE OR OVEREATING: 0
2. FEELING DOWN, DEPRESSED OR HOPELESS: 0
3. TROUBLE FALLING OR STAYING ASLEEP: 0
SUM OF ALL RESPONSES TO PHQ QUESTIONS 1-9: 1
SUM OF ALL RESPONSES TO PHQ9 QUESTIONS 1 & 2: 0
10. IF YOU CHECKED OFF ANY PROBLEMS, HOW DIFFICULT HAVE THESE PROBLEMS MADE IT FOR YOU TO DO YOUR WORK, TAKE CARE OF THINGS AT HOME, OR GET ALONG WITH OTHER PEOPLE: 0
SUM OF ALL RESPONSES TO PHQ QUESTIONS 1-9: 1
1. LITTLE INTEREST OR PLEASURE IN DOING THINGS: 0
SUM OF ALL RESPONSES TO PHQ QUESTIONS 1-9: 1
6. FEELING BAD ABOUT YOURSELF - OR THAT YOU ARE A FAILURE OR HAVE LET YOURSELF OR YOUR FAMILY DOWN: 0
SUM OF ALL RESPONSES TO PHQ QUESTIONS 1-9: 1
8. MOVING OR SPEAKING SO SLOWLY THAT OTHER PEOPLE COULD HAVE NOTICED. OR THE OPPOSITE, BEING SO FIGETY OR RESTLESS THAT YOU HAVE BEEN MOVING AROUND A LOT MORE THAN USUAL: 0
4. FEELING TIRED OR HAVING LITTLE ENERGY: 1
9. THOUGHTS THAT YOU WOULD BE BETTER OFF DEAD, OR OF HURTING YOURSELF: 0
7. TROUBLE CONCENTRATING ON THINGS, SUCH AS READING THE NEWSPAPER OR WATCHING TELEVISION: 0

## 2022-12-08 ASSESSMENT — LIFESTYLE VARIABLES
HOW MANY STANDARD DRINKS CONTAINING ALCOHOL DO YOU HAVE ON A TYPICAL DAY: PATIENT DOES NOT DRINK
HOW OFTEN DO YOU HAVE A DRINK CONTAINING ALCOHOL: NEVER

## 2022-12-08 NOTE — PATIENT INSTRUCTIONS
Personalized Preventive Plan for Kendy Stark - 12/8/2022  Medicare offers a range of preventive health benefits. Some of the tests and screenings are paid in full while other may be subject to a deductible, co-insurance, and/or copay. Some of these benefits include a comprehensive review of your medical history including lifestyle, illnesses that may run in your family, and various assessments and screenings as appropriate. After reviewing your medical record and screening and assessments performed today your provider may have ordered immunizations, labs, imaging, and/or referrals for you. A list of these orders (if applicable) as well as your Preventive Care list are included within your After Visit Summary for your review. Other Preventive Recommendations:    A preventive eye exam performed by an eye specialist is recommended every 1-2 years to screen for glaucoma; cataracts, macular degeneration, and other eye disorders. A preventive dental visit is recommended every 6 months. Try to get at least 150 minutes of exercise per week or 10,000 steps per day on a pedometer . Order or download the FREE \"Exercise & Physical Activity: Your Everyday Guide\" from [employer]. Call 2-554.853.4753 or search [employer] online. You need 8162-4350 mg of calcium and 4987-7977 IU of vitamin D per day. It is possible to meet your calcium requirement with diet alone, but a vitamin D supplement is usually necessary to meet this goal.  When exposed to the sun, use a sunscreen that protects against both UVA and UVB radiation with an SPF of 30 or greater. Reapply every 2 to 3 hours or after sweating, drying off with a towel, or swimming. Always wear a seat belt when traveling in a car. Always wear a helmet when riding a bicycle or motorcycle. Learning About Being Active as an Older Adult  Why is being active important as you get older?      Being active is one of the best things you can do for your health. And it's never too late to start. Being active--or getting active, if you aren't already--has definite benefits. It can:  Give you more energy,  Keep your mind sharp. Improve balance to reduce your risk of falls. Help you manage chronic illness with fewer medicines. No matter how old you are, how fit you are, or what health problems you have, there is a form of activity that will work for you. And the more physical activity you can do, the better your overall health will be. What kinds of activity can help you stay healthy? Being more active will make your daily activities easier. Physical activity includes planned exercise and things you do in daily life. There are four types of activity:  Aerobic. Doing aerobic activity makes your heart and lungs strong. Includes walking, dancing, and gardening. Aim for at least 2½ hours spread throughout the week. It improves your energy and can help you sleep better. Muscle-strengthening. This type of activity can help maintain muscle and strengthen bones. Includes climbing stairs, using resistance bands, and lifting or carrying heavy loads. Aim for at least twice a week. It can help protect the knees and other joints. Stretching. Stretching gives you better range of motion in joints and muscles. Includes upper arm stretches, calf stretches, and gentle yoga. Aim for at least twice a week, preferably after your muscles are warmed up from other activities. It can help you function better in daily life. Balancing. This helps you stay coordinated and have good posture. Includes heel-to-toe walking, guicho chi, and certain types of yoga. Aim for at least 3 days a week. It can reduce your risk of falling. Even if you have a hard time meeting the recommendations, it's better to be more active than less active.  All activity done in each category counts toward your weekly total. You'd be surprised how daily things like carrying groceries, keeping up with grandchildren, and taking the stairs can add up. What keeps you from being active? If you've had a hard time being more active, you're not alone. Maybe you remember being able to do more. Or maybe you've never thought of yourself as being active. It's frustrating when you can't do the things you want. Being more active can help. What's holding you back? Getting started. Have a goal, but break it into easy tasks. Small steps build into big accomplishments. Staying motivated. If you feel like skipping your activity, remember your goal. Maybe you want to move better and stay independent. Every activity gets you one step closer. Not feeling your best.  Start with 5 minutes of an activity you enjoy. Prove to yourself you can do it. As you get comfortable, increase your time. You may not be where you want to be. But you're in the process of getting there. Everyone starts somewhere. How can you find safe ways to stay active? Talk with your doctor about any physical challenges you're facing. Make a plan with your doctor if you have a health problem or aren't sure how to get started with activity. If you're already active, ask your doctor if there is anything you should change to stay safe as your body and health change. If you tend to feel dizzy after you take medicine, avoid activity at that time. Try being active before you take your medicine. This will reduce your risk of falls. If you plan to be active at home, make sure to clear your space before you get started. Remove things like TV cords, coffee tables, and throw rugs. It's safest to have plenty of space to move freely. The key to getting more active is to take it slow and steady. Try to improve only a little bit at a time. Pick just one area to improve on at first. And if an activity hurts, stop and talk to your doctor. Where can you learn more?   Go to http://www.bledsoe.com/ and enter P600 to learn more about \"Learning About Being Active as an Older Adult. \"  Current as of: October 10, 2022               Content Version: 13.5  © 2006-2022 [employer]. Care instructions adapted under license by [facility] ([facility]). If you have questions about a medical condition or this instruction, always ask your healthcare professional. [facility] any warranty or liability for your use of this information. Learning About Dental Care for Older Adults  Dental care for older adults: Overview  Dental care for older people is much the same as for younger adults. But older adults do have concerns that younger adults do not. Older adults may have problems with gum disease and decay on the roots of their teeth. They may need missing teeth replaced or broken fillings fixed. Or they may have dentures that need to be cared for. Some older adults may have trouble holding a toothbrush. You can help remind the person you are caring for to brush and floss their teeth or to clean their dentures. In some cases, you may need to do the brushing and other dental care tasks. People who have trouble using their hands or who have dementia may need this extra help. How can you help with dental care? Normal dental care  To keep the teeth and gums healthy:  Brush the teeth with fluoride toothpaste twice a day--in the morning and at night--and floss at least once a day. Plaque can quickly build up on the teeth of older adults. Watch for the signs of gum disease. These signs include gums that bleed after brushing or after eating hard foods, such as apples. See a dentist regularly. Many experts recommend checkups every 6 months. Keep the dentist up to date on any new medications the person is taking. Encourage a balanced diet that includes whole grains, vegetables, and fruits, and that is low in saturated fat and sodium. Encourage the person you're caring for not to use tobacco products.  They can affect dental and general health. Many older adults have a fixed income and feel that they can't afford dental care. But most towns and cities have programs in which dentists help older adults by lowering fees. Contact your area's public health offices or  for information about dental care in your area. Using a toothbrush  Older adults with arthritis sometimes have trouble brushing their teeth because they can't easily hold the toothbrush. Their hands and fingers may be stiff, painful, or weak. If this is the case, you can: Offer an electric toothbrush. Enlarge the handle of a non-electric toothbrush by wrapping a sponge, an elastic bandage, or adhesive tape around it. Push the toothbrush handle through a ball made of rubber or soft foam.  Make the handle longer and thicker by taping Popsicle sticks or tongue depressors to it. You may also be able to buy special toothbrushes, toothpaste dispensers, and floss holders. Your doctor may recommend a soft-bristle toothbrush if the person you care for bleeds easily. Bleeding can happen because of a health problem or from certain medicines. A toothpaste for sensitive teeth may help if the person you care for has sensitive teeth. How do you brush and floss someone's teeth? If the person you are caring for has a hard time cleaning their teeth on their own, you may need to brush and floss their teeth for them. It may be easiest to have the person sit and face away from you, and to sit or stand behind them. That way you can steady their head against your arm as you reach around to floss and brush their teeth. Choose a place that has good lighting and is comfortable for both of you. Before you begin, gather your supplies. You will need gloves, floss, a toothbrush, and a container to hold water if you are not near a sink. Wash and dry your hands well and put on gloves. Start by flossing:  Gently work a piece of floss between each of the teeth toward the gums.  A plastic flossing tool may make this easier, and they are available at most [employer]. Curve the floss around each tooth into a U-shape and gently slide it under the gum line. Move the floss firmly up and down several times to scrape off the plaque. After you've finished flossing, throw away the used floss and begin brushing:  Wet the brush and apply toothpaste. Place the brush at a 45-degree angle where the teeth meet the gums. Press firmly, and move the brush in small circles over the surface of the teeth. Be careful not to brush too hard. Vigorous brushing can make the gums pull away from the teeth and can scratch the tooth enamel. Brush all surfaces of the teeth, on the tongue side and on the cheek side. Pay special attention to the front teeth and all surfaces of the back teeth. Brush chewing surfaces with short back-and-forth strokes. After you've finished, help the person rinse the remaining toothpaste from their mouth. Where can you learn more? Go to http://www.woods.com/ and enter F944 to learn more about \"Learning About Dental Care for Older Adults. \"  Current as of: June 16, 2022               Content Version: 13.5  © 2006-2022 [employer], [employer]. Care instructions adapted under license by [facility] ([facility]). If you have questions about a medical condition or this instruction, always ask your healthcare professional. [place] [address] any warranty or liability for your use of this information. Advance Directives: Care Instructions  Overview  An advance directive is a legal way to state your wishes at the end of your life. It tells your family and your doctor what to do if you can't say what you want. There are two main types of advance directives. You can change them any time your wishes change. Living will. This form tells your family and your doctor your wishes about life support and other treatment. The form is also called a declaration.   Medical power of . This form lets you name a person to make treatment decisions for you when you can't speak for yourself. This person is called a health care agent (health care proxy, health care surrogate). The form is also called a durable power of  for health care. If you do not have an advance directive, decisions about your medical care may be made by a family member, or by a doctor or a  who doesn't know you. It may help to think of an advance directive as a gift to the people who care for you. If you have one, they won't have to make tough decisions by themselves. For more information, including forms for your state, see the [address] website (www.caringinfo.org/planning/advance-directives/). Follow-up care is a key part of your treatment and safety. Be sure to make and go to all appointments, and call your doctor if you are having problems. It's also a good idea to know your test results and keep a list of the medicines you take. What should you include in an advance directive? Many states have a unique advance directive form. (It may ask you to address specific issues.) Or you might use a universal form that's approved by many states. If your form doesn't tell you what to address, it may be hard to know what to include in your advance directive. Use the questions below to help you get started. Who do you want to make decisions about your medical care if you are not able to? What life-support measures do you want if you have a serious illness that gets worse over time or can't be cured? What are you most afraid of that might happen? (Maybe you're afraid of having pain, losing your independence, or being kept alive by machines.)  Where would you prefer to die? (Your home? A hospital? A nursing home?)  Do you want to donate your organs when you die? Do you want certain [facility] practices performed before you die? When should you call for help?   Be sure to contact your doctor if you have any questions. Where can you learn more? Go to http://www.bledsoe.com/ and enter R264 to learn more about \"Advance Directives: Care Instructions. \"  Current as of: June 16, 2022               Content Version: 13.5  © 3965-1688 Healthwise, [employer]. Care instructions adapted under license by [facility] ([facility]). If you have questions about a medical condition or this instruction, always ask your healthcare professional. [address] any warranty or liability for your use of this information. Personalized Preventive Plan for Antolin Kilpatrick - 12/8/2022  Medicare offers a range of preventive health benefits. Some of the tests and screenings are paid in full while other may be subject to a deductible, co-insurance, and/or copay. Some of these benefits include a comprehensive review of your medical history including lifestyle, illnesses that may run in your family, and various assessments and screenings as appropriate. After reviewing your medical record and screening and assessments performed today your provider may have ordered immunizations, labs, imaging, and/or referrals for you. A list of these orders (if applicable) as well as your Preventive Care list are included within your After Visit Summary for your review. Other Preventive Recommendations:    A preventive eye exam performed by an eye specialist is recommended every 1-2 years to screen for glaucoma; cataracts, macular degeneration, and other eye disorders. A preventive dental visit is recommended every 6 months. Try to get at least 150 minutes of exercise per week or 10,000 steps per day on a pedometer . Order or download the FREE \"Exercise & Physical Activity: Your Everyday Guide\" from [employer]. Call 2-137.759.2855 or search [employer] online. You need 3257-5840 mg of calcium and 2415-6447 IU of vitamin D per day.  It is possible to meet your calcium requirement with diet alone, but a vitamin D supplement is usually necessary to meet this goal.  When exposed to the sun, use a sunscreen that protects against both UVA and UVB radiation with an SPF of 30 or greater. Reapply every 2 to 3 hours or after sweating, drying off with a towel, or swimming. Always wear a seat belt when traveling in a car. Always wear a helmet when riding a bicycle or motorcycle.

## 2022-12-14 NOTE — PROGRESS NOTES
[address] [address]  Dept: [phone]  Dept Fax: [phone]: [phone]     Visit Date:  12/8/2022  Patient:  Nadia Aguilar  YOB: 1955  Provider: Corby Maria MD    This note is in the [address] (Assessment/Plan/Subjective/Objective) format, to reduce scrolling and searching for information by the reviewer. Assessment & Plan      Presyncope: Improved. Echo showed EF 50%. However he prefers not to be evaluated by cardiology at this time. No other symptoms of heart failure at this time. Hyperlipidemia: Continue simvastatin. We will continue to monitor. Agoraphobia: Continue Paxil, BuSpar, clonazepam.  We will continue to monitor. Appears to be stable at this time. Medical discussion was for a duration of 13 minutes     Diagnosis Orders   1. Medicare annual wellness visit, subsequent            Discussed use, benefit, and side effects of prescribed medications. All questions answered. Patient voiced understanding. Reviewed health maintenance. HPI:     He underwent a phone evaluation today for   Chief Complaint   Patient presents with    Medicare AWV        He underwent a full evaluation today to follow-up. Last, he had an episode of presyncope, an echo was ordered. The echo showed EF of 50%. He says that he is doing a lot better. Denies any other episodes of syncope/presyncope. Denies chest pain, shortness of breath, palpitations. He said that he prefers not to see cardiology at to time.   History of hyperlipidemia and agoraphobia that are unchanged      Subjective:      REVIEW OF SYSTEMS    Constitutional: Denies fever, chills  Eyes: Denies recent vision changes  Cardiovascular: Denies chest pain, LL edema  Respiratory: Denies cough, wheezes  Gastrointestinal: Denies abdominal pain, nausea, vomiting  Skin: Denies rash  Endocrine: Denies polyuria  Hematologic: Denies bruising  Genitourinary: Denies dysuria, hematuria  Neurological: Denies headache, numbness      Medications    Current Outpatient Medications:     PARoxetine (PAXIL) 30 MG tablet, TAKE TWO TABLETS BY MOUTH EVERY MORNING, Disp: 180 tablet, Rfl: 3    albuterol sulfate HFA (PROVENTIL;VENTOLIN;PROAIR) 108 (90 Base) MCG/ACT inhaler, Inhale 2 puffs into the lungs every 6 hours as needed for Wheezing, Disp: 18 g, Rfl: 5    clonazePAM (KLONOPIN) 1 MG tablet, TAKE ONE TABLET BY MOUTH DAILY, Disp: 90 tablet, Rfl: 3    busPIRone (BUSPAR) 15 MG tablet, TAKE ONE TABLET BY MOUTH THREE TIMES A DAY, Disp: 270 tablet, Rfl: 3    simvastatin (ZOCOR) 20 MG tablet, Take 1 tablet by mouth nightly, Disp: 90 tablet, Rfl: 3    Ascorbic Acid (RITA-C PO), Take 1,000 mg by mouth daily, Disp: , Rfl:     Cholecalciferol (VITAMIN D3) 50 MCG (2000 UT) CAPS, Take by mouth daily, Disp: , Rfl:     Latanoprostene Bunod (VYZULTA) 0.024 % SOLN, Apply 1 drop to eye nightly, Disp: , Rfl:     Handicap Placard MISC, by Does not apply route EXPIRES 10/13/2025, Disp: 3 each, Rfl: 0    aspirin 81 MG EC tablet, Take 81 mg by mouth daily. , Disp: , Rfl:     pseudoephedrine-guaiFENesin (MUCINEX D)  MG per extended release tablet, Take 1 tablet by mouth every 12 hours (Patient not taking: No sig reported), Disp: , Rfl:     The patient has No Known Allergies. Past Medical History  Maris Jones  has a past medical history of Agoraphobia with panic disorder, BPH (benign prostatic hyperplasia), Depression, History of tobacco abuse, Hyperlipemia, Hypogonadism, male, IFG (impaired fasting glucose), Increased liver enzymes, and Obesity. Past Surgical History  The patient  has no past surgical history on file. Family History  This patient's family history includes Cancer in his father, mother, and another family member. Social History  Maris Jones  reports that he quit smoking about 14 years ago. His smoking use included cigarettes.  He has a 30.00 pack-year smoking history. He has never used smokeless tobacco. He reports that he does not drink alcohol and does not use drugs. Health Maintenance:    Health Maintenance   Topic Date Due    Shingles vaccine (1 of 2) Never done    Low dose CT lung screening  Never done    COVID-19 Vaccine (3 - Booster for Pfizer series) 11/26/2021    A1C test (Diabetic or Prediabetic)  10/14/2022    Lipids  11/08/2023    Depression Monitoring  12/08/2023    Annual Wellness Visit (AWV)  12/09/2023    Colorectal Cancer Screen  04/20/2024    DTaP/Tdap/Td vaccine (2 - Td or Tdap) 01/27/2025    Flu vaccine  Completed    Pneumococcal 65+ years Vaccine  Completed    AAA screen  Completed    Hepatitis C screen  Completed    Hepatitis A vaccine  Aged Out    Hib vaccine  Aged Out    Meningococcal (ACWY) vaccine  Aged Out           Objective:       Labs Reviewed 12/8/2022:    Lab Results   Component Value Date    WBC 6.8 11/08/2022    HGB 14.9 11/08/2022    HCT 44.9 11/08/2022     11/08/2022    CHOL 181 11/08/2022    TRIG 219 (H) 11/08/2022    HDL 44 11/08/2022    ALT 28 11/08/2022    AST 31 11/08/2022     11/08/2022    K 4.2 11/08/2022     11/08/2022    CREATININE 0.82 11/08/2022    BUN 11 11/08/2022    CO2 26 11/08/2022    TSH 3.01 10/14/2021    LABA1C 5.9 10/14/2021              Electronically signed Cl GALEANO MD on 12/8/2022 at 6:37 PM      This note has been created using the Epic electronic health record, and dictated in part by [employer] dictation system. Despite the documenting physician's best efforts, there may be errors in spelling, grammar or syntax.     Pursuant to the emergency declaration under the [facility] waiver authority and the [employer] Act, this phone visit was conducted, with patient's consent, to reduce the patient's risk of exposure to COVID-19 and provide continuity of care for an established patient. Services were provided through a phone discussion virtually to substitute for in-person clinic visit. During the phone call, the provider was in his office at [facility] in [place], [place] while the patient was at home.

## 2023-02-13 RX ORDER — TAMSULOSIN HYDROCHLORIDE 0.4 MG/1
CAPSULE ORAL
Qty: 90 CAPSULE | Refills: 2 | Status: SHIPPED | OUTPATIENT
Start: 2023-02-13

## 2023-02-13 NOTE — TELEPHONE ENCOUNTER
Bal Heard called requesting a refill of the below medication which has been pended for you:     Requested Prescriptions     Pending Prescriptions Disp Refills    tamsulosin (FLOMAX) 0.4 MG capsule [Pharmacy Med Name: TAMSULOSIN HCL 0.4 MG CAPSULE] 90 capsule      Sig: TAKE ONE CAPSULE BY MOUTH DAILY       Last Appointment Date: 12/8/2022  Next Appointment Date: 3/9/2023    No Known Allergies

## 2023-03-07 PROBLEM — H47.20 OPTIC ATROPHY OF LEFT EYE: Status: ACTIVE | Noted: 2021-03-11

## 2023-03-09 ENCOUNTER — TELEMEDICINE (OUTPATIENT)
Dept: INTERNAL MEDICINE | Age: 68
End: 2023-03-09
Payer: MEDICARE

## 2023-03-09 DIAGNOSIS — N40.0 BENIGN PROSTATIC HYPERPLASIA WITHOUT LOWER URINARY TRACT SYMPTOMS: ICD-10-CM

## 2023-03-09 DIAGNOSIS — F33.42 RECURRENT MAJOR DEPRESSIVE DISORDER, IN FULL REMISSION (HCC): ICD-10-CM

## 2023-03-09 DIAGNOSIS — E66.01 SEVERE OBESITY (BMI 35.0-39.9) WITH COMORBIDITY (HCC): ICD-10-CM

## 2023-03-09 DIAGNOSIS — E78.5 DYSLIPIDEMIA: Primary | ICD-10-CM

## 2023-03-09 PROCEDURE — 99214 OFFICE O/P EST MOD 30 MIN: CPT | Performed by: INTERNAL MEDICINE

## 2023-03-09 PROCEDURE — 1123F ACP DISCUSS/DSCN MKR DOCD: CPT | Performed by: INTERNAL MEDICINE

## 2023-03-09 SDOH — ECONOMIC STABILITY: FOOD INSECURITY: WITHIN THE PAST 12 MONTHS, THE FOOD YOU BOUGHT JUST DIDN'T LAST AND YOU DIDN'T HAVE MONEY TO GET MORE.: NEVER TRUE

## 2023-03-09 SDOH — ECONOMIC STABILITY: FOOD INSECURITY: WITHIN THE PAST 12 MONTHS, YOU WORRIED THAT YOUR FOOD WOULD RUN OUT BEFORE YOU GOT MONEY TO BUY MORE.: NEVER TRUE

## 2023-03-09 SDOH — ECONOMIC STABILITY: HOUSING INSECURITY
IN THE LAST 12 MONTHS, WAS THERE A TIME WHEN YOU DID NOT HAVE A STEADY PLACE TO SLEEP OR SLEPT IN A SHELTER (INCLUDING NOW)?: NO

## 2023-03-09 SDOH — ECONOMIC STABILITY: INCOME INSECURITY: HOW HARD IS IT FOR YOU TO PAY FOR THE VERY BASICS LIKE FOOD, HOUSING, MEDICAL CARE, AND HEATING?: NOT VERY HARD

## 2023-03-09 ASSESSMENT — PATIENT HEALTH QUESTIONNAIRE - PHQ9
SUM OF ALL RESPONSES TO PHQ QUESTIONS 1-9: 0
1. LITTLE INTEREST OR PLEASURE IN DOING THINGS: 0
10. IF YOU CHECKED OFF ANY PROBLEMS, HOW DIFFICULT HAVE THESE PROBLEMS MADE IT FOR YOU TO DO YOUR WORK, TAKE CARE OF THINGS AT HOME, OR GET ALONG WITH OTHER PEOPLE: 0
2. FEELING DOWN, DEPRESSED OR HOPELESS: 0
6. FEELING BAD ABOUT YOURSELF - OR THAT YOU ARE A FAILURE OR HAVE LET YOURSELF OR YOUR FAMILY DOWN: 0
4. FEELING TIRED OR HAVING LITTLE ENERGY: 0
8. MOVING OR SPEAKING SO SLOWLY THAT OTHER PEOPLE COULD HAVE NOTICED. OR THE OPPOSITE, BEING SO FIGETY OR RESTLESS THAT YOU HAVE BEEN MOVING AROUND A LOT MORE THAN USUAL: 0
7. TROUBLE CONCENTRATING ON THINGS, SUCH AS READING THE NEWSPAPER OR WATCHING TELEVISION: 0
SUM OF ALL RESPONSES TO PHQ QUESTIONS 1-9: 0
SUM OF ALL RESPONSES TO PHQ QUESTIONS 1-9: 0
9. THOUGHTS THAT YOU WOULD BE BETTER OFF DEAD, OR OF HURTING YOURSELF: 0
SUM OF ALL RESPONSES TO PHQ9 QUESTIONS 1 & 2: 0
3. TROUBLE FALLING OR STAYING ASLEEP: 0
5. POOR APPETITE OR OVEREATING: 0
DEPRESSION UNABLE TO ASSESS: FUNCTIONAL CAPACITY MOTIVATION LIMITS ACCURACY
SUM OF ALL RESPONSES TO PHQ QUESTIONS 1-9: 0

## 2023-03-09 NOTE — PROGRESS NOTES
[address]  [place] [address]  Dept: [phone]  Dept Fax: [phone]: [phone]     Visit Date:  3/9/2023  Patient:  Justin Hodge  YOB: 1955  Provider: Candy Mendieta MD    This note is in the [address] (Assessment/Plan/Subjective/Objective) format, to reduce scrolling and searching for information by the reviewer. Assessment & Plan      Presyncope: No issues since the only episode. Echo showed EF 50%. He did not want to be evaluated at cardiology at that time, and I think we can monitor for now    Hyperlipidemia: Continue simvastatin. We will continue to monitor. Agoraphobia: Continue Paxil, BuSpar, clonazepam.  We will continue to monitor. Appears to be stable at this time. BPH: Continue Flomax. We will continue to monitor. Medical discussion was for a duration of 13 minutes     Diagnosis Orders   1. Dyslipidemia        2. Severe obesity (BMI 35.0-39. 9) with comorbidity ([address]        3. Recurrent major depressive disorder, in full remission ([facility]        4. Benign prostatic hyperplasia without lower urinary tract symptoms              Discussed use, benefit, and side effects of prescribed medications. All questions answered. Patient voiced understanding. Reviewed health maintenance. HPI:     He underwent a phone evaluation today for   Chief Complaint   Patient presents with    3 Month Follow-Up        Presents to follow-up. He had an episode of presyncope several months ago, but has not had any episodes since. Echocardiogram was done afterwards and showed EF of 50%, therefore it seems possible that it was due to anxiety and not as thoroughly cardiac pathology. He says that he has no lightheadedness, dizziness at this time. Denies chest pain, shortness of breath as well and denies palpitations.     Has a history of agoraphobia for which he uses Paxil and clonazepam as well as BuSpar. I advised that we can try to cut down on clonazepam, I advised that he might be able to use half a tablet 1 day of the week instead of a full tablet. Counseled regarding tachyphylaxis. Will reassess next visit. He verbalized understanding.       Subjective:      REVIEW OF SYSTEMS    Constitutional: Denies fever, chills  Eyes: Denies recent vision changes  Cardiovascular: Denies chest pain, LL edema  Respiratory: Denies cough, wheezes  Gastrointestinal: Denies abdominal pain, nausea, vomiting  Skin: Denies rash  Endocrine: Denies polyuria  Hematologic: Denies bruising  Genitourinary: Denies dysuria, hematuria  Neurological: Denies headache, numbness      Medications    Current Outpatient Medications:     tamsulosin (FLOMAX) 0.4 MG capsule, TAKE ONE CAPSULE BY MOUTH DAILY, Disp: 90 capsule, Rfl: 2    PARoxetine (PAXIL) 30 MG tablet, TAKE TWO TABLETS BY MOUTH EVERY MORNING, Disp: 180 tablet, Rfl: 3    albuterol sulfate HFA (PROVENTIL;VENTOLIN;PROAIR) 108 (90 Base) MCG/ACT inhaler, Inhale 2 puffs into the lungs every 6 hours as needed for Wheezing, Disp: 18 g, Rfl: 5    clonazePAM (KLONOPIN) 1 MG tablet, TAKE ONE TABLET BY MOUTH DAILY, Disp: 90 tablet, Rfl: 3    busPIRone (BUSPAR) 15 MG tablet, TAKE ONE TABLET BY MOUTH THREE TIMES A DAY, Disp: 270 tablet, Rfl: 3    simvastatin (ZOCOR) 20 MG tablet, Take 1 tablet by mouth nightly, Disp: 90 tablet, Rfl: 3    Ascorbic Acid (RITA-C PO), Take 1,000 mg by mouth daily, Disp: , Rfl:     Cholecalciferol (VITAMIN D3) 50 MCG (2000 UT) CAPS, Take by mouth daily, Disp: , Rfl:     pseudoephedrine-guaiFENesin (MUCINEX D)  MG per extended release tablet, Take 1 tablet by mouth in the morning and 1 tablet in the evening., Disp: , Rfl:     Latanoprostene Bunod (VYZULTA) 0.024 % SOLN, Apply 1 drop to eye nightly, Disp: , Rfl:     Handicap Placard MISC, by Does not apply route EXPIRES 10/13/2025, Disp: 3 each, Rfl: 0    aspirin 81 MG EC tablet, Take 81 mg by mouth daily. , Disp: , Rfl:     The patient has No Known Allergies. Past Medical History  Hawa Rossi  has a past medical history of Agoraphobia with panic disorder, BPH (benign prostatic hyperplasia), Depression, History of tobacco abuse, Hyperlipemia, Hypogonadism, male, IFG (impaired fasting glucose), Increased liver enzymes, and Obesity. Past Surgical History  The patient  has no past surgical history on file. Family History  This patient's family history includes Cancer in his father, mother, and another family member. Social History  Hawa Rossi  reports that he quit smoking about 15 years ago. His smoking use included cigarettes. He has a 30.00 pack-year smoking history. He has never used smokeless tobacco. He reports that he does not drink alcohol and does not use drugs.     Health Maintenance:    Health Maintenance   Topic Date Due    Shingles vaccine (1 of 2) Never done    COVID-19 Vaccine (3 - Booster for Pfizer series) 11/26/2021    A1C test (Diabetic or Prediabetic)  10/14/2022    Lipids  11/08/2023    Depression Monitoring  12/08/2023    Annual Wellness Visit (AWV)  12/09/2023    Colorectal Cancer Screen  04/20/2024    DTaP/Tdap/Td vaccine (2 - Td or Tdap) 01/27/2025    Flu vaccine  Completed    Pneumococcal 65+ years Vaccine  Completed    AAA screen  Completed    Hepatitis C screen  Completed    Hepatitis A vaccine  Aged Out    Hib vaccine  Aged Out    Meningococcal (ACWY) vaccine  Aged Out           Objective:       Labs Reviewed 3/9/2023:    Lab Results   Component Value Date    WBC 6.8 11/08/2022    HGB 14.9 11/08/2022    HCT 44.9 11/08/2022     11/08/2022    CHOL 181 11/08/2022    TRIG 219 (H) 11/08/2022    HDL 44 11/08/2022    ALT 28 11/08/2022    AST 31 11/08/2022     11/08/2022    K 4.2 11/08/2022     11/08/2022    CREATININE 0.82 11/08/2022    BUN 11 11/08/2022    CO2 26 11/08/2022    TSH 3.01 10/14/2021    LABA1C 5.9 10/14/2021              Electronically signed Audrea Seip ALI, MD on 3/9/2023 at 5:16 PM      This note has been created using the Epic electronic health record, and dictated in part by [employer] dictation system. Despite the documenting physician's best efforts, there may be errors in spelling, grammar or syntax. Pursuant to the emergency declaration under the [address] [place] [address], [facility] waiver authority and the [employer] Act, this phone visit was conducted, with patient's consent, to reduce the patient's risk of exposure to COVID-19 and provide continuity of care for an established patient. Services were provided through a phone discussion virtually to substitute for in-person clinic visit. During the phone call, the provider was in his office at [facility] in [place], [place] while the patient was at home.

## 2023-08-31 RX ORDER — ALBUTEROL SULFATE 90 UG/1
AEROSOL, METERED RESPIRATORY (INHALATION)
Qty: 8.5 G | Refills: 5 | Status: SHIPPED | OUTPATIENT
Start: 2023-08-31

## 2023-09-01 DIAGNOSIS — F40.01 AGORAPHOBIA WITH PANIC DISORDER: ICD-10-CM

## 2023-09-05 RX ORDER — BUSPIRONE HYDROCHLORIDE 15 MG/1
TABLET ORAL
Qty: 270 TABLET | Refills: 3 | Status: SHIPPED | OUTPATIENT
Start: 2023-09-05

## 2023-11-06 RX ORDER — TAMSULOSIN HYDROCHLORIDE 0.4 MG/1
CAPSULE ORAL
Qty: 90 CAPSULE | Refills: 2 | Status: SHIPPED | OUTPATIENT
Start: 2023-11-06

## 2023-12-04 RX ORDER — PAROXETINE 30 MG/1
TABLET, FILM COATED ORAL
Qty: 180 TABLET | Refills: 3 | Status: SHIPPED | OUTPATIENT
Start: 2023-12-04

## 2023-12-12 ENCOUNTER — HOSPITAL ENCOUNTER (OUTPATIENT)
Age: 68
Setting detail: SPECIMEN
Discharge: HOME | End: 2023-12-12
Payer: MEDICARE

## 2023-12-12 ENCOUNTER — OFFICE VISIT (OUTPATIENT)
Dept: INTERNAL MEDICINE | Age: 68
End: 2023-12-12
Payer: MEDICARE

## 2023-12-12 VITALS
BODY MASS INDEX: 38.22 KG/M2 | DIASTOLIC BLOOD PRESSURE: 74 MMHG | WEIGHT: 273 LBS | HEART RATE: 67 BPM | OXYGEN SATURATION: 95 % | HEIGHT: 71 IN | RESPIRATION RATE: 16 BRPM | SYSTOLIC BLOOD PRESSURE: 124 MMHG

## 2023-12-12 DIAGNOSIS — E78.5 DYSLIPIDEMIA: ICD-10-CM

## 2023-12-12 DIAGNOSIS — H81.10 BENIGN PAROXYSMAL POSITIONAL VERTIGO, UNSPECIFIED LATERALITY: ICD-10-CM

## 2023-12-12 DIAGNOSIS — M54.50 LOW BACK PAIN, UNSPECIFIED BACK PAIN LATERALITY, UNSPECIFIED CHRONICITY, UNSPECIFIED WHETHER SCIATICA PRESENT: ICD-10-CM

## 2023-12-12 DIAGNOSIS — F40.01 AGORAPHOBIA WITH PANIC DISORDER: ICD-10-CM

## 2023-12-12 DIAGNOSIS — H61.22 IMPACTED CERUMEN OF LEFT EAR: ICD-10-CM

## 2023-12-12 DIAGNOSIS — Z00.00 MEDICARE ANNUAL WELLNESS VISIT, SUBSEQUENT: Primary | ICD-10-CM

## 2023-12-12 DIAGNOSIS — R73.01 IFG (IMPAIRED FASTING GLUCOSE): ICD-10-CM

## 2023-12-12 DIAGNOSIS — N40.0 BENIGN PROSTATIC HYPERPLASIA WITHOUT LOWER URINARY TRACT SYMPTOMS: ICD-10-CM

## 2023-12-12 LAB
ALBUMIN SERPL-MCNC: 4.5 G/DL (ref 3.5–5.2)
ALBUMIN/GLOB SERPL: 1.7 {RATIO} (ref 1–2.5)
ALP SERPL-CCNC: 94 U/L (ref 40–129)
ALT SERPL-CCNC: 27 U/L (ref 5–41)
ANION GAP SERPL CALCULATED.3IONS-SCNC: 12 MMOL/L (ref 9–17)
AST SERPL-CCNC: 26 U/L
BASOPHILS # BLD: 0.06 K/UL (ref 0–0.2)
BASOPHILS NFR BLD: 1 % (ref 0–2)
BILIRUB SERPL-MCNC: 0.5 MG/DL (ref 0.3–1.2)
BUN SERPL-MCNC: 14 MG/DL (ref 8–23)
BUN/CREAT SERPL: 16 (ref 9–20)
CALCIUM SERPL-MCNC: 9.4 MG/DL (ref 8.6–10.4)
CHLORIDE SERPL-SCNC: 105 MMOL/L (ref 98–107)
CHOLEST SERPL-MCNC: 196 MG/DL
CHOLESTEROL/HDL RATIO: 4.2
CO2 SERPL-SCNC: 26 MMOL/L (ref 20–31)
CREAT SERPL-MCNC: 0.9 MG/DL (ref 0.7–1.2)
EOSINOPHIL # BLD: 0.15 K/UL (ref 0–0.44)
EOSINOPHILS RELATIVE PERCENT: 2 % (ref 1–4)
ERYTHROCYTE [DISTWIDTH] IN BLOOD BY AUTOMATED COUNT: 13.6 % (ref 11.8–14.4)
EST. AVERAGE GLUCOSE BLD GHB EST-MCNC: 117 MG/DL
GFR SERPL CREATININE-BSD FRML MDRD: >60 ML/MIN/1.73M2
GLUCOSE SERPL-MCNC: 119 MG/DL (ref 70–99)
HBA1C MFR BLD: 5.7 % (ref 4–6)
HCT VFR BLD AUTO: 43.2 % (ref 40.7–50.3)
HDLC SERPL-MCNC: 47 MG/DL
HGB BLD-MCNC: 14.3 G/DL (ref 13–17)
IMM GRANULOCYTES # BLD AUTO: 0.03 K/UL (ref 0–0.3)
IMM GRANULOCYTES NFR BLD: 0 %
LDLC SERPL CALC-MCNC: 106 MG/DL (ref 0–130)
LYMPHOCYTES NFR BLD: 0.98 K/UL (ref 1.1–3.7)
LYMPHOCYTES RELATIVE PERCENT: 13 % (ref 24–43)
MCH RBC QN AUTO: 31.4 PG (ref 25.2–33.5)
MCHC RBC AUTO-ENTMCNC: 33.1 G/DL (ref 25.2–33.5)
MCV RBC AUTO: 94.7 FL (ref 82.6–102.9)
MONOCYTES NFR BLD: 0.47 K/UL (ref 0.1–1.2)
MONOCYTES NFR BLD: 6 % (ref 3–12)
NEUTROPHILS NFR BLD: 78 % (ref 36–65)
NEUTS SEG NFR BLD: 5.76 K/UL (ref 1.5–8.1)
NRBC BLD-RTO: 0 PER 100 WBC
PLATELET # BLD AUTO: 229 K/UL (ref 138–453)
PMV BLD AUTO: 10 FL (ref 8.1–13.5)
POTASSIUM SERPL-SCNC: 4.3 MMOL/L (ref 3.7–5.3)
PROT SERPL-MCNC: 7.2 G/DL (ref 6.4–8.3)
RBC # BLD AUTO: 4.56 M/UL (ref 4.21–5.77)
SODIUM SERPL-SCNC: 143 MMOL/L (ref 135–144)
TRIGL SERPL-MCNC: 216 MG/DL
WBC OTHER # BLD: 7.5 K/UL (ref 3.5–11.3)

## 2023-12-12 PROCEDURE — 90694 VACC AIIV4 NO PRSRV 0.5ML IM: CPT | Performed by: INTERNAL MEDICINE

## 2023-12-12 PROCEDURE — 36415 COLL VENOUS BLD VENIPUNCTURE: CPT

## 2023-12-12 PROCEDURE — 1123F ACP DISCUSS/DSCN MKR DOCD: CPT | Performed by: INTERNAL MEDICINE

## 2023-12-12 PROCEDURE — G0439 PPPS, SUBSEQ VISIT: HCPCS | Performed by: INTERNAL MEDICINE

## 2023-12-12 PROCEDURE — 85025 COMPLETE CBC W/AUTO DIFF WBC: CPT

## 2023-12-12 PROCEDURE — PBSHW INFLUENZA, FLUAD, (AGE 65 Y+), IM, PF, 0.5 ML: Performed by: INTERNAL MEDICINE

## 2023-12-12 PROCEDURE — 83036 HEMOGLOBIN GLYCOSYLATED A1C: CPT

## 2023-12-12 PROCEDURE — 80061 LIPID PANEL: CPT

## 2023-12-12 PROCEDURE — 99212 OFFICE O/P EST SF 10 MIN: CPT | Performed by: INTERNAL MEDICINE

## 2023-12-12 PROCEDURE — 99214 OFFICE O/P EST MOD 30 MIN: CPT | Performed by: INTERNAL MEDICINE

## 2023-12-12 PROCEDURE — 80053 COMPREHEN METABOLIC PANEL: CPT

## 2023-12-12 PROCEDURE — 69210 REMOVE IMPACTED EAR WAX UNI: CPT | Performed by: INTERNAL MEDICINE

## 2023-12-12 RX ORDER — LORAZEPAM 0.5 MG/1
0.5 TABLET ORAL DAILY PRN
Qty: 7 TABLET | Refills: 0 | Status: SHIPPED | OUTPATIENT
Start: 2023-12-12 | End: 2023-12-19

## 2023-12-12 ASSESSMENT — PATIENT HEALTH QUESTIONNAIRE - PHQ9
SUM OF ALL RESPONSES TO PHQ QUESTIONS 1-9: 0
SUM OF ALL RESPONSES TO PHQ QUESTIONS 1-9: 0
8. MOVING OR SPEAKING SO SLOWLY THAT OTHER PEOPLE COULD HAVE NOTICED. OR THE OPPOSITE, BEING SO FIGETY OR RESTLESS THAT YOU HAVE BEEN MOVING AROUND A LOT MORE THAN USUAL: 0
1. LITTLE INTEREST OR PLEASURE IN DOING THINGS: 0
3. TROUBLE FALLING OR STAYING ASLEEP: 0
6. FEELING BAD ABOUT YOURSELF - OR THAT YOU ARE A FAILURE OR HAVE LET YOURSELF OR YOUR FAMILY DOWN: 0
5. POOR APPETITE OR OVEREATING: 0
SUM OF ALL RESPONSES TO PHQ QUESTIONS 1-9: 0
10. IF YOU CHECKED OFF ANY PROBLEMS, HOW DIFFICULT HAVE THESE PROBLEMS MADE IT FOR YOU TO DO YOUR WORK, TAKE CARE OF THINGS AT HOME, OR GET ALONG WITH OTHER PEOPLE: 0
2. FEELING DOWN, DEPRESSED OR HOPELESS: 0
7. TROUBLE CONCENTRATING ON THINGS, SUCH AS READING THE NEWSPAPER OR WATCHING TELEVISION: 0
4. FEELING TIRED OR HAVING LITTLE ENERGY: 0
SUM OF ALL RESPONSES TO PHQ QUESTIONS 1-9: 0
9. THOUGHTS THAT YOU WOULD BE BETTER OFF DEAD, OR OF HURTING YOURSELF: 0
SUM OF ALL RESPONSES TO PHQ9 QUESTIONS 1 & 2: 0

## 2023-12-12 ASSESSMENT — COLUMBIA-SUICIDE SEVERITY RATING SCALE - C-SSRS
3. HAVE YOU BEEN THINKING ABOUT HOW YOU MIGHT KILL YOURSELF?: NO
5. HAVE YOU STARTED TO WORK OUT OR WORKED OUT THE DETAILS OF HOW TO KILL YOURSELF? DO YOU INTEND TO CARRY OUT THIS PLAN?: NO
7. DID THIS OCCUR IN THE LAST THREE MONTHS: NO
4. HAVE YOU HAD THESE THOUGHTS AND HAD SOME INTENTION OF ACTING ON THEM?: NO

## 2023-12-12 ASSESSMENT — LIFESTYLE VARIABLES
HOW OFTEN DO YOU HAVE A DRINK CONTAINING ALCOHOL: NEVER
HOW MANY STANDARD DRINKS CONTAINING ALCOHOL DO YOU HAVE ON A TYPICAL DAY: PATIENT DOES NOT DRINK

## 2023-12-12 NOTE — PROGRESS NOTES
Medicare Annual Wellness Visit    Natan Madera is here for Cerumen Impaction (Left ear ) and Hip Pain (Right side hip pain , )    Assessment & Plan     Recommendations for Preventive Services Due: see orders and patient instructions/AVS.  Recommended screening schedule for the next 5-10 years is provided to the patient in written form: see Patient Instructions/AVS.     No follow-ups on file. Subjective       Patient's complete Health Risk Assessment and screening values have been reviewed and are found in Flowsheets. The following problems were reviewed today and where indicated follow up appointments were made and/or referrals ordered. Positive Risk Factor Screenings with Interventions:                 Weight and Activity:  Physical Activity: Inactive (12/12/2023)    Exercise Vital Sign     Days of Exercise per Week: 0 days     Minutes of Exercise per Session: 0 min     On average, how many days per week do you engage in moderate to strenuous exercise (like a brisk walk)?: 0 days  Have you lost any weight without trying in the past 3 months?: No  There is no height or weight on file to calculate BMI. (!) Abnormal      Inactivity Interventions:  Patient advised to follow up in the office for further evaluation and treatment  Obesity Interventions:                 Advanced Directives:  Do you have a Living Will?: (!) No    Intervention:  has NO advanced directive - not interested in additional information                       Objective   There were no vitals filed for this visit. There is no height or weight on file to calculate BMI. No Known Allergies  Prior to Visit Medications    Medication Sig Taking?  Authorizing Provider   PARoxetine (PAXIL) 30 MG tablet TAKE TWO TABLETS BY MOUTH EVERY MORNING Yes Mariama Mary MD   tamsulosin (FLOMAX) 0.4 MG capsule TAKE ONE CAPSULE BY MOUTH DAILY Yes Mariama Mary MD   busPIRone (BUSPAR) 15 MG tablet TAKE ONE TABLET BY MOUTH THREE TIMES A DAY Yes Mariama Mary

## 2023-12-12 NOTE — PROGRESS NOTES
[facility] INTERNAL MEDICINE    Visit Date:  12/12/2023  Patient:  Beth Gottlieb  YOB: 1955    Assessment & Plan     Low back pain: Will refer to physical therapy. No red flags at this time. Can continue to use Aleve as needed. Benign positional vertigo: Will refer to physical therapy as above. Hyperlipidemia: Continue simvastatin. We will order lipid panel next visit. BPH: Asymptomatic at this time. Continue tamsulosin. Depression: Mood stable at this time. Continue Paxil. Agoraphobia with panic disorder: Uses Klonopin as well as BuSpar. Appears to be stable. Diagnosis Orders   1. Medicare annual wellness visit, subsequent        2. Impacted cerumen of left ear  60027 - [place] REMOVE IMPACTED EAR WAX    [place] REMOVAL IMPACTED CERUMEN INSTRUMENTATION UNILAT      3. Low back pain, unspecified back pain laterality, unspecified chronicity, unspecified whether sciatica present  External Referral To Physical Therapy      4. Dyslipidemia  CBC with Auto Differential    Comprehensive Metabolic Panel    Lipid Panel    CBC with Auto Differential    Comprehensive Metabolic Panel    Lipid Panel      5. IFG (impaired fasting glucose)  Hemoglobin A1C    Hemoglobin A1C      6. Agoraphobia with panic disorder  LORazepam (ATIVAN) 0.5 MG tablet      7. Benign prostatic hyperplasia without lower urinary tract symptoms        8. Benign paroxysmal positional vertigo, unspecified laterality  External Referral To Physical Therapy          Chief Complaint  Cerumen Impaction (Left ear ) and Hip Pain (Right side hip pain , )      History of Present Illness   Reports that he has pain in the low back on the right side, that radiates down his buttock and leg. Says that pain has been going on for the last several months. No recent trauma. Pain is better with rest and worse with activity. Denies fever, chills, weight loss, bowel/bladder incontinence.   He is interested in trying physical therapy

## 2024-03-19 RX ORDER — SIMVASTATIN 20 MG
TABLET ORAL
Qty: 90 TABLET | Refills: 3 | Status: SHIPPED | OUTPATIENT
Start: 2024-03-19

## 2024-06-13 ENCOUNTER — OFFICE VISIT (OUTPATIENT)
Dept: INTERNAL MEDICINE | Age: 69
End: 2024-06-13
Payer: MEDICARE

## 2024-06-13 ENCOUNTER — HOSPITAL ENCOUNTER (OUTPATIENT)
Age: 69
Setting detail: SPECIMEN
Discharge: HOME | End: 2024-06-13
Payer: MEDICARE

## 2024-06-13 VITALS
WEIGHT: 268 LBS | SYSTOLIC BLOOD PRESSURE: 134 MMHG | HEART RATE: 67 BPM | BODY MASS INDEX: 37.52 KG/M2 | HEIGHT: 71 IN | DIASTOLIC BLOOD PRESSURE: 74 MMHG | RESPIRATION RATE: 16 BRPM | OXYGEN SATURATION: 95 %

## 2024-06-13 DIAGNOSIS — F40.01 AGORAPHOBIA WITH PANIC DISORDER: ICD-10-CM

## 2024-06-13 DIAGNOSIS — E78.5 DYSLIPIDEMIA: ICD-10-CM

## 2024-06-13 DIAGNOSIS — R73.01 IFG (IMPAIRED FASTING GLUCOSE): ICD-10-CM

## 2024-06-13 DIAGNOSIS — Z00.00 MEDICARE ANNUAL WELLNESS VISIT, SUBSEQUENT: Primary | ICD-10-CM

## 2024-06-13 DIAGNOSIS — M54.50 LOW BACK PAIN, UNSPECIFIED BACK PAIN LATERALITY, UNSPECIFIED CHRONICITY, UNSPECIFIED WHETHER SCIATICA PRESENT: ICD-10-CM

## 2024-06-13 DIAGNOSIS — R63.4 WEIGHT LOSS: ICD-10-CM

## 2024-06-13 LAB
ALBUMIN SERPL-MCNC: 4.7 G/DL (ref 3.5–5.2)
ALBUMIN/GLOB SERPL: 1.7 {RATIO} (ref 1–2.5)
ALP SERPL-CCNC: 92 U/L (ref 40–129)
ALT SERPL-CCNC: 24 U/L (ref 5–41)
ANION GAP SERPL CALCULATED.3IONS-SCNC: 11 MMOL/L (ref 9–17)
AST SERPL-CCNC: 27 U/L
BASOPHILS # BLD: 0.05 K/UL (ref 0–0.2)
BASOPHILS NFR BLD: 1 % (ref 0–2)
BILIRUB SERPL-MCNC: 0.5 MG/DL (ref 0.3–1.2)
BUN SERPL-MCNC: 17 MG/DL (ref 8–23)
BUN/CREAT SERPL: 19 (ref 9–20)
CALCIUM SERPL-MCNC: 9.3 MG/DL (ref 8.6–10.4)
CHLORIDE SERPL-SCNC: 103 MMOL/L (ref 98–107)
CHOLEST SERPL-MCNC: 190 MG/DL (ref 0–199)
CHOLESTEROL/HDL RATIO: 4
CO2 SERPL-SCNC: 26 MMOL/L (ref 20–31)
CREAT SERPL-MCNC: 0.9 MG/DL (ref 0.7–1.2)
EOSINOPHIL # BLD: 0.14 K/UL (ref 0–0.44)
EOSINOPHILS RELATIVE PERCENT: 2 % (ref 1–4)
ERYTHROCYTE [DISTWIDTH] IN BLOOD BY AUTOMATED COUNT: 13.8 % (ref 11.8–14.4)
EST. AVERAGE GLUCOSE BLD GHB EST-MCNC: 120 MG/DL
GLUCOSE SERPL-MCNC: 143 MG/DL (ref 70–99)
HBA1C MFR BLD: 5.8 % (ref 4–6)
HCT VFR BLD AUTO: 44.2 % (ref 40.7–50.3)
HDLC SERPL-MCNC: 47 MG/DL
HGB BLD-MCNC: 14.8 G/DL (ref 13–17)
IMM GRANULOCYTES # BLD AUTO: 0.03 K/UL (ref 0–0.3)
IMM GRANULOCYTES NFR BLD: 0 %
LDLC SERPL CALC-MCNC: 99 MG/DL (ref 0–100)
LYMPHOCYTES NFR BLD: 0.86 K/UL (ref 1.1–3.7)
LYMPHOCYTES RELATIVE PERCENT: 12 % (ref 24–43)
MCH RBC QN AUTO: 31.6 PG (ref 25.2–33.5)
MCHC RBC AUTO-ENTMCNC: 33.5 G/DL (ref 25.2–33.5)
MCV RBC AUTO: 94.4 FL (ref 82.6–102.9)
MONOCYTES NFR BLD: 0.44 K/UL (ref 0.1–1.2)
MONOCYTES NFR BLD: 6 % (ref 3–12)
NEUTROPHILS NFR BLD: 79 % (ref 36–65)
NEUTS SEG NFR BLD: 5.97 K/UL (ref 1.5–8.1)
NRBC BLD-RTO: 0 PER 100 WBC
PLATELET # BLD AUTO: 231 K/UL (ref 138–453)
PMV BLD AUTO: 10.2 FL (ref 8.1–13.5)
POTASSIUM SERPL-SCNC: 4.6 MMOL/L (ref 3.7–5.3)
PROT SERPL-MCNC: 7.4 G/DL (ref 6.4–8.3)
RBC # BLD AUTO: 4.68 M/UL (ref 4.21–5.77)
SODIUM SERPL-SCNC: 140 MMOL/L (ref 135–144)
TRIGL SERPL-MCNC: 222 MG/DL
TSH SERPL DL<=0.05 MIU/L-ACNC: 3.04 UIU/ML (ref 0.3–5)
VLDLC SERPL CALC-MCNC: 44 MG/DL
WBC OTHER # BLD: 7.5 K/UL (ref 3.5–11.3)

## 2024-06-13 PROCEDURE — 99212 OFFICE O/P EST SF 10 MIN: CPT | Performed by: INTERNAL MEDICINE

## 2024-06-13 PROCEDURE — 80053 COMPREHEN METABOLIC PANEL: CPT

## 2024-06-13 PROCEDURE — 36415 COLL VENOUS BLD VENIPUNCTURE: CPT

## 2024-06-13 PROCEDURE — 83036 HEMOGLOBIN GLYCOSYLATED A1C: CPT

## 2024-06-13 PROCEDURE — 80061 LIPID PANEL: CPT

## 2024-06-13 PROCEDURE — 85025 COMPLETE CBC W/AUTO DIFF WBC: CPT

## 2024-06-13 PROCEDURE — 84443 ASSAY THYROID STIM HORMONE: CPT

## 2024-06-13 RX ORDER — LORAZEPAM 0.5 MG/1
0.5 TABLET ORAL DAILY PRN
Qty: 7 TABLET | Refills: 0 | Status: SHIPPED | OUTPATIENT
Start: 2024-06-13 | End: 2024-06-20

## 2024-06-13 RX ORDER — BACLOFEN 10 MG/1
10 TABLET ORAL 2 TIMES DAILY PRN
Qty: 60 TABLET | Refills: 0 | Status: SHIPPED | OUTPATIENT
Start: 2024-06-13

## 2024-06-13 RX ORDER — LATANOPROST 0.005 %
1 DROPS OPHTHALMIC (EYE) NIGHTLY
COMMUNITY
Start: 2023-10-11

## 2024-06-13 SDOH — ECONOMIC STABILITY: FOOD INSECURITY: WITHIN THE PAST 12 MONTHS, THE FOOD YOU BOUGHT JUST DIDN'T LAST AND YOU DIDN'T HAVE MONEY TO GET MORE.: NEVER TRUE

## 2024-06-13 SDOH — ECONOMIC STABILITY: INCOME INSECURITY: HOW HARD IS IT FOR YOU TO PAY FOR THE VERY BASICS LIKE FOOD, HOUSING, MEDICAL CARE, AND HEATING?: NOT VERY HARD

## 2024-06-13 SDOH — ECONOMIC STABILITY: FOOD INSECURITY: WITHIN THE PAST 12 MONTHS, YOU WORRIED THAT YOUR FOOD WOULD RUN OUT BEFORE YOU GOT MONEY TO BUY MORE.: NEVER TRUE

## 2024-06-13 ASSESSMENT — PATIENT HEALTH QUESTIONNAIRE - PHQ9
9. THOUGHTS THAT YOU WOULD BE BETTER OFF DEAD, OR OF HURTING YOURSELF: NOT AT ALL
2. FEELING DOWN, DEPRESSED OR HOPELESS: NOT AT ALL
7. TROUBLE CONCENTRATING ON THINGS, SUCH AS READING THE NEWSPAPER OR WATCHING TELEVISION: NOT AT ALL
1. LITTLE INTEREST OR PLEASURE IN DOING THINGS: NOT AT ALL
10. IF YOU CHECKED OFF ANY PROBLEMS, HOW DIFFICULT HAVE THESE PROBLEMS MADE IT FOR YOU TO DO YOUR WORK, TAKE CARE OF THINGS AT HOME, OR GET ALONG WITH OTHER PEOPLE: NOT DIFFICULT AT ALL
8. MOVING OR SPEAKING SO SLOWLY THAT OTHER PEOPLE COULD HAVE NOTICED. OR THE OPPOSITE, BEING SO FIGETY OR RESTLESS THAT YOU HAVE BEEN MOVING AROUND A LOT MORE THAN USUAL: NOT AT ALL
SUM OF ALL RESPONSES TO PHQ QUESTIONS 1-9: 0
DEPRESSION UNABLE TO ASSESS: FUNCTIONAL CAPACITY MOTIVATION LIMITS ACCURACY
SUM OF ALL RESPONSES TO PHQ QUESTIONS 1-9: 0
5. POOR APPETITE OR OVEREATING: NOT AT ALL
3. TROUBLE FALLING OR STAYING ASLEEP: NOT AT ALL
4. FEELING TIRED OR HAVING LITTLE ENERGY: NOT AT ALL
SUM OF ALL RESPONSES TO PHQ QUESTIONS 1-9: 0
SUM OF ALL RESPONSES TO PHQ QUESTIONS 1-9: 0
SUM OF ALL RESPONSES TO PHQ9 QUESTIONS 1 & 2: 0
6. FEELING BAD ABOUT YOURSELF - OR THAT YOU ARE A FAILURE OR HAVE LET YOURSELF OR YOUR FAMILY DOWN: NOT AT ALL

## 2024-06-13 NOTE — PROGRESS NOTES
Medicare Annual Wellness Visit    Primitivo Ochoa is here for Medicare AWV, Hip Pain (Right side hip pain, but doesn't think it is his hip , he thinks it is the muscle in th thigh area , has trouble walking ), and Muscle Pain (In the arms , been going on for over a year , it is getting worse, )    Assessment & Plan   Agoraphobia with panic disorder  The following orders have not been finalized:  -     LORazepam (ATIVAN) 0.5 MG tablet    Recommendations for Preventive Services Due: see orders and patient instructions/AVS.  Recommended screening schedule for the next 5-10 years is provided to the patient in written form: see Patient Instructions/AVS.     No follow-ups on file.     Subjective       Patient's complete Health Risk Assessment and screening values have been reviewed and are found in Flowsheets. The following problems were reviewed today and where indicated follow up appointments were made and/or referrals ordered.    Positive Risk Factor Screenings with Interventions:    Fall Risk:  Do you feel unsteady or are you worried about falling? : (!) yes  2 or more falls in past year?: no  Fall with injury in past year?: no       Interventions:    Reviewed medications, home hazards, visual acuity, and co-morbidities that can increase risk for falls     Depression:  Depression Unable to Assess: Functional capacity motivation limits accuracy  PHQ-2 Score: 0  PHQ-9 Total Score: 0    Interpretation:  5-9 mild   10-14 moderate   15-19 moderately severe   20-27 severe              Activity, Diet, and Weight:  On average, how many days per week do you engage in moderate to strenuous exercise (like a brisk walk)?: 0 days  On average, how many minutes do you engage in exercise at this level?: 0 min    Do you eat balanced/healthy meals regularly?: Yes    Body mass index is 37.38 kg/m². (!) Abnormal        Inactivity Interventions:  Patient advised to follow up in the office for further evaluation and treatment  Obesity

## 2024-06-13 NOTE — PROGRESS NOTES
[facility] INTERNAL MEDICINE    Visit Date:  6/13/2024  Patient:  Primitivo Ochoa  YOB: 1955    Assessment & Plan     Hip pain: He is not interested in getting an x-ray at this time.  Will prescribe baclofen.  Reassess next visit.    Hyperlipidemia: Continue simvastatin.  We will order lipid panel next visit.    BPH: Asymptomatic at this time.  Continue tamsulosin.    Depression: Mood stable at this time.  Continue Paxil.    Agoraphobia with panic disorder: Uses Klonopin as well as BuSpar.  Appears to be stable.     Diagnosis Orders   1. Medicare annual wellness visit, subsequent        2. Agoraphobia with panic disorder  LORazepam (ATIVAN) 0.5 MG tablet      3. Low back pain, unspecified back pain laterality, unspecified chronicity, unspecified whether sciatica present        4. Dyslipidemia  CBC with Auto Differential    Comprehensive Metabolic Panel    Lipid Panel      5. IFG (impaired fasting glucose)  Hemoglobin A1C      6. Weight loss  TSH with Reflex          Chief Complaint  Medicare AWV, Hip Pain (Right side hip pain, but doesn't think it is his hip , he thinks it is the muscle in th thigh area , has trouble walking ), and Muscle Pain (In the arms , been going on for over a year , it is getting worse, )      History of Present Illness   Reports that he still has pain in his right hip that has been going on for the last several months.  Thinks it might be a muscle in the thigh area.  Says that he thinks it might be muscle spasms.  Denies fever, chills.  No recent trauma.  Says that he limps when he walks.  He is not interested in getting an x-ray at this time.  But I advised that we can try muscle relaxants and he agrees to try.    Has a history of hyperlipidemia, BPH, depression that are unchanged    Objective  /74 (Site: Left Upper Arm, Position: Sitting, Cuff Size: Large Adult)   Pulse 67   Resp 16   Ht 1.803 m (5' 11\")   Wt 121.6 kg (268 lb)   SpO2 95%   BMI 37.38

## 2024-08-02 RX ORDER — TAMSULOSIN HYDROCHLORIDE 0.4 MG/1
0.4 CAPSULE ORAL DAILY
Qty: 90 CAPSULE | Refills: 0 | Status: SHIPPED | OUTPATIENT
Start: 2024-08-02

## 2024-08-02 NOTE — TELEPHONE ENCOUNTER
Primitivo called requesting a refill of the below medication which has been pended for you:     Requested Prescriptions     Pending Prescriptions Disp Refills    tamsulosin (FLOMAX) 0.4 MG capsule [Pharmacy Med Name: TAMSULOSIN HCL 0.4 MG CAPSULE] 90 capsule 2     Sig: TAKE 1 CAPSULE BY MOUTH DAILY       Last Appointment Date: 6/13/2024  Next Appointment Date: 12/16/2024    Allergies   Allergen Reactions    Covid-19 Mrna Vaccine (Pfizer) [Covid-19 Mrna Vacc (Moderna)] Anxiety, Other (See Comments) and Dizziness or Vertigo     Confusion

## 2024-08-16 RX ORDER — BACLOFEN 10 MG/1
10 TABLET ORAL 2 TIMES DAILY PRN
Qty: 60 TABLET | Refills: 0 | Status: SHIPPED | OUTPATIENT
Start: 2024-08-16

## 2024-08-30 DIAGNOSIS — F40.01 AGORAPHOBIA WITH PANIC DISORDER: ICD-10-CM

## 2024-09-03 RX ORDER — BUSPIRONE HYDROCHLORIDE 15 MG/1
TABLET ORAL
Qty: 270 TABLET | Refills: 3 | Status: SHIPPED | OUTPATIENT
Start: 2024-09-03

## 2024-10-15 RX ORDER — BACLOFEN 10 MG/1
10 TABLET ORAL 2 TIMES DAILY PRN
Qty: 60 TABLET | Refills: 0 | Status: SHIPPED | OUTPATIENT
Start: 2024-10-15

## 2024-11-13 NOTE — TELEPHONE ENCOUNTER
Primitivo called requesting a refill of the below medication which has been pended for you:     Requested Prescriptions     Pending Prescriptions Disp Refills    tamsulosin (FLOMAX) 0.4 MG capsule 90 capsule 0     Sig: Take 1 capsule by mouth daily       Last Appointment Date: 6/13/2024  Next Appointment Date: 12/16/2024    Allergies   Allergen Reactions    Covid-19 Mrna Vaccine (Pfizer) [Covid-19 Mrna Vacc (Moderna)] Anxiety, Other (See Comments) and Dizziness or Vertigo     Confusion

## 2024-11-14 RX ORDER — TAMSULOSIN HYDROCHLORIDE 0.4 MG/1
0.4 CAPSULE ORAL DAILY
Qty: 90 CAPSULE | Refills: 0 | Status: SHIPPED | OUTPATIENT
Start: 2024-11-14

## 2024-11-25 RX ORDER — ALBUTEROL SULFATE 90 UG/1
2 INHALANT RESPIRATORY (INHALATION) EVERY 6 HOURS PRN
Qty: 8.5 G | Refills: 5 | Status: SHIPPED | OUTPATIENT
Start: 2024-11-25

## 2024-12-02 RX ORDER — PAROXETINE 30 MG/1
60 TABLET, FILM COATED ORAL EVERY MORNING
Qty: 180 TABLET | Refills: 3 | Status: SHIPPED | OUTPATIENT
Start: 2024-12-02

## 2024-12-12 RX ORDER — BACLOFEN 10 MG/1
10 TABLET ORAL 2 TIMES DAILY PRN
Qty: 180 TABLET | Refills: 3 | Status: SHIPPED | OUTPATIENT
Start: 2024-12-12

## 2024-12-16 ENCOUNTER — TELEPHONE (OUTPATIENT)
Dept: INTERNAL MEDICINE | Age: 69
End: 2024-12-16

## 2024-12-16 ENCOUNTER — OFFICE VISIT (OUTPATIENT)
Dept: INTERNAL MEDICINE | Age: 69
End: 2024-12-16
Payer: MEDICARE

## 2024-12-16 VITALS
DIASTOLIC BLOOD PRESSURE: 78 MMHG | HEART RATE: 58 BPM | SYSTOLIC BLOOD PRESSURE: 134 MMHG | WEIGHT: 271 LBS | OXYGEN SATURATION: 98 % | BODY MASS INDEX: 37.94 KG/M2 | HEIGHT: 71 IN | RESPIRATION RATE: 16 BRPM

## 2024-12-16 DIAGNOSIS — E78.5 DYSLIPIDEMIA: Primary | ICD-10-CM

## 2024-12-16 DIAGNOSIS — N40.0 BENIGN PROSTATIC HYPERPLASIA WITHOUT LOWER URINARY TRACT SYMPTOMS: ICD-10-CM

## 2024-12-16 DIAGNOSIS — F33.42 RECURRENT MAJOR DEPRESSIVE DISORDER, IN FULL REMISSION (HCC): ICD-10-CM

## 2024-12-16 DIAGNOSIS — F40.01 AGORAPHOBIA WITH PANIC DISORDER: ICD-10-CM

## 2024-12-16 PROCEDURE — 99212 OFFICE O/P EST SF 10 MIN: CPT | Performed by: INTERNAL MEDICINE

## 2024-12-16 PROCEDURE — 90653 IIV ADJUVANT VACCINE IM: CPT | Performed by: INTERNAL MEDICINE

## 2024-12-16 RX ORDER — LORAZEPAM 0.5 MG/1
0.5 TABLET ORAL DAILY PRN
Qty: 7 TABLET | Refills: 0 | Status: SHIPPED | OUTPATIENT
Start: 2024-12-16 | End: 2024-12-23

## 2024-12-16 NOTE — TELEPHONE ENCOUNTER
Wife called in and wanted it documented that the patient got his Zoster Vaccine today at the pharmacy.

## 2024-12-20 NOTE — PROGRESS NOTES
[facility] INTERNAL MEDICINE    Visit Date:  12/16/2024  Patient:  Primitivo Ochoa  YOB: 1955    Assessment & Plan     Hyperlipidemia: Continue simvastatin.  Will continue to monitor lipid panels.    BPH: Asymptomatic at this time.  Continue tamsulosin.    Depression: Mood stable at this time.  Continue Paxil.    Agoraphobia with panic disorder: Uses Klonopin as well as BuSpar.  Appears to be stable.     Diagnosis Orders   1. Dyslipidemia        2. Agoraphobia with panic disorder  LORazepam (ATIVAN) 0.5 MG tablet      3. Recurrent major depressive disorder, in full remission (HCC)        4. Benign prostatic hyperplasia without lower urinary tract symptoms            Chief Complaint  6 Month Follow-Up, Ear Fullness, and Dizziness      History of Present Illness   Presents to follow-up.  Says that he feels okay at this time.  Says that he at dizziness previously, 1 episode, which seem to have improved.  Denies fever, chills, chest pain, shortness of breath, palpitations at this time.    Has a history of hyperlipidemia, BPH, depression that are unchanged    Objective  /78 (Site: Left Upper Arm, Position: Sitting, Cuff Size: Medium Adult)   Pulse 58   Resp 16   Ht 1.803 m (5' 11\")   Wt 122.9 kg (271 lb)   SpO2 98%   BMI 37.80 kg/m²   Constitutional: No acute distress.  Sits in chair comfortably  Eyes: Sclerae nonicteric. No lid lag or proptosis  HENT: External ears normal. No external lesions on the nose  Neck: No gross masses. Trachea visibly midline  Respiratory: Good air entry bilaterally.  No wheezing or crackles  Cardiovascular: Normal S1-S2.  No murmurs.  No lower extremity edema  Gastrointestinal: No visible masses. No visible hernias  Skin: No abnormal rashes. No abnormal masses  Neurologic: Cranial nerves grossly intact  Psychiatric: Normal affect. Alert and oriented    Medications  Current Outpatient Medications:     LORazepam (ATIVAN) 0.5 MG tablet, Take 1 tablet by

## 2025-02-03 RX ORDER — TAMSULOSIN HYDROCHLORIDE 0.4 MG/1
0.4 CAPSULE ORAL DAILY
Qty: 90 CAPSULE | Refills: 0 | Status: SHIPPED | OUTPATIENT
Start: 2025-02-03

## 2025-03-13 RX ORDER — SIMVASTATIN 20 MG
20 TABLET ORAL NIGHTLY
Qty: 90 TABLET | Refills: 0 | Status: SHIPPED | OUTPATIENT
Start: 2025-03-13

## 2025-03-13 NOTE — TELEPHONE ENCOUNTER
Primitivo called requesting a refill of the below medication which has been pended for you:     Requested Prescriptions     Pending Prescriptions Disp Refills    simvastatin (ZOCOR) 20 MG tablet [Pharmacy Med Name: SIMVASTATIN 20 MG TABLET] 90 tablet 3     Sig: TAKE ONE TABLET BY MOUTH ONCE NIGHTLY       Last Appointment Date: 12/16/2024  Next Appointment Date: 6/23/2025    Allergies   Allergen Reactions    Covid-19 Mrna Vaccine (Pfizer) [Covid-19 Mrna Vacc (Moderna)] Anxiety, Other (See Comments) and Dizziness or Vertigo     Confusion

## 2025-05-01 RX ORDER — TAMSULOSIN HYDROCHLORIDE 0.4 MG/1
0.4 CAPSULE ORAL DAILY
Qty: 90 CAPSULE | Refills: 3 | Status: SHIPPED | OUTPATIENT
Start: 2025-05-01

## 2025-06-12 RX ORDER — SIMVASTATIN 20 MG
20 TABLET ORAL NIGHTLY
Qty: 90 TABLET | Refills: 3 | Status: SHIPPED | OUTPATIENT
Start: 2025-06-12

## 2025-06-12 NOTE — TELEPHONE ENCOUNTER
Pharmacy requesting a refill of the below medication which has been pended for you:     Requested Prescriptions     Pending Prescriptions Disp Refills    simvastatin (ZOCOR) 20 MG tablet 90 tablet 3     Sig: Take 1 tablet by mouth nightly       Last Appointment Date: 12/16/2024  Next Appointment Date: 6/23/2025    Allergies   Allergen Reactions    Covid-19 Mrna Vaccine (Pfizer) [Covid-19 Mrna Vacc (Moderna)] Anxiety, Other (See Comments) and Dizziness or Vertigo     Confusion

## 2025-06-23 ENCOUNTER — HOSPITAL ENCOUNTER (OUTPATIENT)
Age: 70
Setting detail: SPECIMEN
Discharge: HOME | End: 2025-06-23
Payer: MEDICARE

## 2025-06-23 ENCOUNTER — OFFICE VISIT (OUTPATIENT)
Dept: INTERNAL MEDICINE | Age: 70
End: 2025-06-23
Payer: MEDICARE

## 2025-06-23 VITALS
RESPIRATION RATE: 16 BRPM | OXYGEN SATURATION: 98 % | SYSTOLIC BLOOD PRESSURE: 136 MMHG | HEIGHT: 71 IN | DIASTOLIC BLOOD PRESSURE: 82 MMHG | HEART RATE: 73 BPM | BODY MASS INDEX: 38.5 KG/M2 | WEIGHT: 275 LBS

## 2025-06-23 DIAGNOSIS — Z00.00 MEDICARE ANNUAL WELLNESS VISIT, SUBSEQUENT: Primary | ICD-10-CM

## 2025-06-23 DIAGNOSIS — E78.5 DYSLIPIDEMIA: ICD-10-CM

## 2025-06-23 DIAGNOSIS — H54.40 BLINDNESS OF LEFT EYE WITH NORMAL VISION IN CONTRALATERAL EYE: ICD-10-CM

## 2025-06-23 DIAGNOSIS — R73.01 IFG (IMPAIRED FASTING GLUCOSE): ICD-10-CM

## 2025-06-23 LAB
ALBUMIN SERPL-MCNC: 4.4 G/DL (ref 3.5–5.2)
ALBUMIN/GLOB SERPL: 1.5 {RATIO} (ref 1–2.5)
ALP SERPL-CCNC: 98 U/L (ref 40–129)
ALT SERPL-CCNC: 25 U/L (ref 10–50)
ANION GAP SERPL CALCULATED.3IONS-SCNC: 15 MMOL/L (ref 9–16)
AST SERPL-CCNC: 30 U/L (ref 10–50)
BASOPHILS # BLD: 0.04 K/UL (ref 0–0.2)
BASOPHILS NFR BLD: 1 % (ref 0–2)
BILIRUB SERPL-MCNC: 0.6 MG/DL (ref 0–1.2)
BUN SERPL-MCNC: 18 MG/DL (ref 8–23)
BUN/CREAT SERPL: 18 (ref 9–20)
CALCIUM SERPL-MCNC: 9.3 MG/DL (ref 8.6–10.4)
CHLORIDE SERPL-SCNC: 101 MMOL/L (ref 98–107)
CHOLEST SERPL-MCNC: 203 MG/DL (ref 0–199)
CHOLESTEROL/HDL RATIO: 4.1
CO2 SERPL-SCNC: 22 MMOL/L (ref 20–31)
CREAT SERPL-MCNC: 1 MG/DL (ref 0.7–1.2)
EOSINOPHIL # BLD: 0.16 K/UL (ref 0–0.44)
EOSINOPHILS RELATIVE PERCENT: 2 % (ref 1–4)
ERYTHROCYTE [DISTWIDTH] IN BLOOD BY AUTOMATED COUNT: 13.8 % (ref 11.8–14.4)
EST. AVERAGE GLUCOSE BLD GHB EST-MCNC: 128 MG/DL
GFR, ESTIMATED: 81 ML/MIN/1.73M2
GLUCOSE SERPL-MCNC: 195 MG/DL (ref 74–99)
HBA1C MFR BLD: 6.1 % (ref 4–6)
HCT VFR BLD AUTO: 45.4 % (ref 40.7–50.3)
HDLC SERPL-MCNC: 49 MG/DL
HGB BLD-MCNC: 15 G/DL (ref 13–17)
IMM GRANULOCYTES # BLD AUTO: <0.03 K/UL (ref 0–0.3)
IMM GRANULOCYTES NFR BLD: 0 %
LDLC SERPL CALC-MCNC: 112 MG/DL (ref 0–100)
LYMPHOCYTES NFR BLD: 0.92 K/UL (ref 1.1–3.7)
LYMPHOCYTES RELATIVE PERCENT: 13 % (ref 24–43)
MCH RBC QN AUTO: 31.1 PG (ref 25.2–33.5)
MCHC RBC AUTO-ENTMCNC: 33 G/DL (ref 25.2–33.5)
MCV RBC AUTO: 94 FL (ref 82.6–102.9)
MONOCYTES NFR BLD: 0.33 K/UL (ref 0.1–1.2)
MONOCYTES NFR BLD: 5 % (ref 3–12)
NEUTROPHILS NFR BLD: 79 % (ref 36–65)
NEUTS SEG NFR BLD: 5.46 K/UL (ref 1.5–8.1)
NRBC BLD-RTO: 0 PER 100 WBC
PLATELET # BLD AUTO: 239 K/UL (ref 138–453)
PMV BLD AUTO: 10.2 FL (ref 8.1–13.5)
POTASSIUM SERPL-SCNC: 4 MMOL/L (ref 3.7–5.3)
PROT SERPL-MCNC: 7.3 G/DL (ref 6.6–8.7)
RBC # BLD AUTO: 4.83 M/UL (ref 4.21–5.77)
SODIUM SERPL-SCNC: 138 MMOL/L (ref 136–145)
TRIGL SERPL-MCNC: 208 MG/DL
VLDLC SERPL CALC-MCNC: 42 MG/DL (ref 1–30)
WBC OTHER # BLD: 6.9 K/UL (ref 3.5–11.3)

## 2025-06-23 PROCEDURE — 1159F MED LIST DOCD IN RCRD: CPT | Performed by: INTERNAL MEDICINE

## 2025-06-23 PROCEDURE — PBSHW TDAP, BOOSTRIX, (AGE 10 YRS+), IM: Performed by: INTERNAL MEDICINE

## 2025-06-23 PROCEDURE — 1123F ACP DISCUSS/DSCN MKR DOCD: CPT | Performed by: INTERNAL MEDICINE

## 2025-06-23 PROCEDURE — 90715 TDAP VACCINE 7 YRS/> IM: CPT | Performed by: INTERNAL MEDICINE

## 2025-06-23 PROCEDURE — 85025 COMPLETE CBC W/AUTO DIFF WBC: CPT

## 2025-06-23 PROCEDURE — 83036 HEMOGLOBIN GLYCOSYLATED A1C: CPT

## 2025-06-23 PROCEDURE — 80061 LIPID PANEL: CPT

## 2025-06-23 PROCEDURE — 80053 COMPREHEN METABOLIC PANEL: CPT

## 2025-06-23 PROCEDURE — 36415 COLL VENOUS BLD VENIPUNCTURE: CPT

## 2025-06-23 PROCEDURE — G0439 PPPS, SUBSEQ VISIT: HCPCS | Performed by: INTERNAL MEDICINE

## 2025-06-23 RX ORDER — BACLOFEN 10 MG/1
10 TABLET ORAL DAILY PRN
Qty: 90 TABLET | Refills: 2 | Status: SHIPPED | OUTPATIENT
Start: 2025-06-23

## 2025-06-23 SDOH — ECONOMIC STABILITY: FOOD INSECURITY: WITHIN THE PAST 12 MONTHS, THE FOOD YOU BOUGHT JUST DIDN'T LAST AND YOU DIDN'T HAVE MONEY TO GET MORE.: NEVER TRUE

## 2025-06-23 SDOH — ECONOMIC STABILITY: FOOD INSECURITY: WITHIN THE PAST 12 MONTHS, YOU WORRIED THAT YOUR FOOD WOULD RUN OUT BEFORE YOU GOT MONEY TO BUY MORE.: NEVER TRUE

## 2025-06-23 ASSESSMENT — PATIENT HEALTH QUESTIONNAIRE - PHQ9
9. THOUGHTS THAT YOU WOULD BE BETTER OFF DEAD, OR OF HURTING YOURSELF: NOT AT ALL
7. TROUBLE CONCENTRATING ON THINGS, SUCH AS READING THE NEWSPAPER OR WATCHING TELEVISION: NOT AT ALL
DEPRESSION UNABLE TO ASSESS: FUNCTIONAL CAPACITY MOTIVATION LIMITS ACCURACY
SUM OF ALL RESPONSES TO PHQ QUESTIONS 1-9: 2
SUM OF ALL RESPONSES TO PHQ QUESTIONS 1-9: 2
6. FEELING BAD ABOUT YOURSELF - OR THAT YOU ARE A FAILURE OR HAVE LET YOURSELF OR YOUR FAMILY DOWN: NOT AT ALL
5. POOR APPETITE OR OVEREATING: NOT AT ALL
SUM OF ALL RESPONSES TO PHQ QUESTIONS 1-9: 2
8. MOVING OR SPEAKING SO SLOWLY THAT OTHER PEOPLE COULD HAVE NOTICED. OR THE OPPOSITE, BEING SO FIGETY OR RESTLESS THAT YOU HAVE BEEN MOVING AROUND A LOT MORE THAN USUAL: NOT AT ALL
1. LITTLE INTEREST OR PLEASURE IN DOING THINGS: SEVERAL DAYS
SUM OF ALL RESPONSES TO PHQ QUESTIONS 1-9: 2
3. TROUBLE FALLING OR STAYING ASLEEP: NOT AT ALL
4. FEELING TIRED OR HAVING LITTLE ENERGY: NOT AT ALL
2. FEELING DOWN, DEPRESSED OR HOPELESS: SEVERAL DAYS
10. IF YOU CHECKED OFF ANY PROBLEMS, HOW DIFFICULT HAVE THESE PROBLEMS MADE IT FOR YOU TO DO YOUR WORK, TAKE CARE OF THINGS AT HOME, OR GET ALONG WITH OTHER PEOPLE: NOT DIFFICULT AT ALL

## 2025-06-23 NOTE — PROGRESS NOTES
Medicare Annual Wellness Visit    Primitivo Ochoa is here for Medicare AWV and Dizziness (Has been having issue with balance and dizziness , getting worse )    Assessment & Plan   Blindness of left eye with normal vision in contralateral eye  The following orders have not been finalized:  -     Handicap Placard [facility]       No follow-ups on file.     Subjective       Patient's complete Health Risk Assessment and screening values have been reviewed and are found in Flowsheets. The following problems were reviewed today and where indicated follow up appointments were made and/or referrals ordered.    Positive Risk Factor Screenings with Interventions:      Depression:  Depression Unable to Assess: Functional capacity motivation limits accuracy  PHQ-2 Score: 2  PHQ-9 Total Score: 2                Inactivity:  On average, how many days per week do you engage in moderate to strenuous exercise (like a brisk walk)?: 0 days (!) Abnormal  On average, how many minutes do you engage in exercise at this level?: 0 min    Interventions:  Patient advised to follow up in the office for further evaluation and treatment     Abnormal BMI (obese):  Body mass index is 38.35 kg/m². (!) Abnormal    Interventions:  Patient advised to follow-up in this office for further evaluation and treatment             Advanced Directives:  Do you have a Living Will?: (!) No    Intervention:  has NO advanced directive - not interested in additional information                     Objective   Vitals:    06/23/25 1107   BP: 136/82   BP Site: Left Upper Arm   Patient Position: Sitting   BP Cuff Size: Large Adult   Pulse: 73   Resp: 16   SpO2: 98%   Weight: 124.7 kg (275 lb)   Height: 1.803 m (5' 11\")      Body mass index is 38.35 kg/m².                    Allergies   Allergen Reactions    Covid-19 Mrna Vaccine (Pfizer) [Covid-19 Mrna Vacc (Moderna)] Anxiety, Other (See Comments) and Dizziness or Vertigo     Confusion      Prior to Visit Medications  
tablet, Take 1 tablet by mouth daily as needed (muscle spasm), Disp: 90 tablet, Rfl: 2    Handicap Placard MISC, by Does not apply route Permanent, Disp: 2 each, Rfl: 0    simvastatin (ZOCOR) 20 MG tablet, Take 1 tablet by mouth nightly, Disp: 90 tablet, Rfl: 3    tamsulosin (FLOMAX) 0.4 MG capsule, TAKE 1 CAPSULE BY MOUTH DAILY, Disp: 90 capsule, Rfl: 3    PARoxetine (PAXIL) 30 MG tablet, TAKE 2 TABLETS BY MOUTH EVERY MORNING, Disp: 180 tablet, Rfl: 3    albuterol sulfate HFA (PROVENTIL;VENTOLIN;PROAIR) 108 (90 Base) MCG/ACT inhaler, Inhale 2 puffs into the lungs every 6 hours as needed for Wheezing, Disp: 8.5 g, Rfl: 5    busPIRone (BUSPAR) 15 MG tablet, TAKE ONE TABLET BY MOUTH THREE TIMES A DAY, Disp: 270 tablet, Rfl: 3    XALATAN 0.005 % ophthalmic solution, Apply 1 drop to eye nightly, Disp: , Rfl:     Ascorbic Acid (RITA-C PO), Take 1,000 mg by mouth daily, Disp: , Rfl:     Cholecalciferol (VITAMIN D3) 50 MCG (2000 UT) CAPS, Take by mouth daily, Disp: , Rfl:     pseudoephedrine-guaiFENesin (MUCINEX D)  MG per extended release tablet, Take 1 tablet by mouth in the morning and 1 tablet in the evening., Disp: , Rfl:     aspirin 81 MG EC tablet, Take 1 tablet by mouth daily, Disp: , Rfl:     Allergies  Covid-19 mrna vaccine (pfizer) [covid-19 mrna vacc (moderna)]    Past Medical History:   Diagnosis Date    Agoraphobia with panic disorder     1.) Dr Singh evaluation, Spring 2004    BPH (benign prostatic hyperplasia)     Depression     History of    History of tobacco abuse     quit tobacco in 2007    Hyperlipemia     Hypogonadism, male     IFG (impaired fasting glucose)     Increased liver enzymes     1.) Hepatitis B and C testing negative 09/12  Hemachromatosis screen neg 9/12      Obesity      No past surgical history on file.  Family History  This patient's family history includes Cancer in his father, mother, sister, and another family member.  Social History  Primitivo  reports that he quit smoking about

## 2025-06-23 NOTE — PATIENT INSTRUCTIONS
Learning About Being Active as an Older Adult  Why is being active important as you get older?     Being active is one of the best things you can do for your health. And it's never too late to start. Being active--or getting active, if you aren't already--has definite benefits. It can:  Give you more energy,  Keep your mind sharp.  Improve balance to reduce your risk of falls.  Help you manage chronic illness with fewer medicines.  No matter how old you are, how fit you are, or what health problems you have, there is a form of activity that will work for you. And the more physical activity you can do, the better your overall health will be.  What kinds of activity can help you stay healthy?  Being more active will make your daily activities easier. Physical activity includes planned exercise and things you do in daily life. There are four types of activity:  Aerobic.  Doing aerobic activity makes your heart and lungs strong.  Includes walking, dancing, and gardening.  Aim for at least 2½ hours spread throughout the week.  It improves your energy and can help you sleep better.  Muscle-strengthening.  This type of activity can help maintain muscle and strengthen bones.  Includes climbing stairs, using resistance bands, and lifting or carrying heavy loads.  Aim for at least twice a week.  It can help protect the knees and other joints.  Stretching.  Stretching gives you better range of motion in joints and muscles.  Includes upper arm stretches, calf stretches, and gentle yoga.  Aim for at least twice a week, preferably after your muscles are warmed up from other activities.  It can help you function better in daily life.  Balancing.  This helps you stay coordinated and have good posture.  Includes heel-to-toe walking, guicho chi, and certain types of yoga.  Aim for at least 3 days a week.  It can reduce your risk of falling.  Even if you have a hard time meeting the recommendations, it's better to be more active

## 2025-06-24 ENCOUNTER — RESULTS FOLLOW-UP (OUTPATIENT)
Dept: INTERNAL MEDICINE | Age: 70
End: 2025-06-24

## 2025-08-26 DIAGNOSIS — F40.01 AGORAPHOBIA WITH PANIC DISORDER: ICD-10-CM

## 2025-08-26 RX ORDER — BUSPIRONE HYDROCHLORIDE 15 MG/1
15 TABLET ORAL 3 TIMES DAILY
Qty: 270 TABLET | Refills: 3 | Status: SHIPPED | OUTPATIENT
Start: 2025-08-26